# Patient Record
Sex: FEMALE | Race: WHITE | NOT HISPANIC OR LATINO | Employment: FULL TIME | URBAN - METROPOLITAN AREA
[De-identification: names, ages, dates, MRNs, and addresses within clinical notes are randomized per-mention and may not be internally consistent; named-entity substitution may affect disease eponyms.]

---

## 2017-07-06 ENCOUNTER — OFFICE VISIT - HEALTHEAST (OUTPATIENT)
Dept: VASCULAR SURGERY | Facility: CLINIC | Age: 52
End: 2017-07-06

## 2017-07-06 DIAGNOSIS — L73.8 PERFORATING FOLLICULITIS: ICD-10-CM

## 2017-07-06 DIAGNOSIS — M06.9 RHEUMATOID ARTHRITIS (H): ICD-10-CM

## 2017-07-06 DIAGNOSIS — E66.01 MORBID OBESITY WITH BMI OF 60.0-69.9, ADULT (H): ICD-10-CM

## 2017-07-06 DIAGNOSIS — I87.303 VENOUS HYPERTENSION OF LOWER EXTREMITY, BILATERAL: ICD-10-CM

## 2017-07-06 ASSESSMENT — MIFFLIN-ST. JEOR: SCORE: 2462.4

## 2017-12-21 ENCOUNTER — COMMUNICATION - HEALTHEAST (OUTPATIENT)
Dept: VASCULAR SURGERY | Facility: CLINIC | Age: 52
End: 2017-12-21

## 2019-05-30 ENCOUNTER — OFFICE VISIT - HEALTHEAST (OUTPATIENT)
Dept: VASCULAR SURGERY | Facility: CLINIC | Age: 54
End: 2019-05-30

## 2019-05-30 DIAGNOSIS — M79.89 SWELLING OF LIMB: ICD-10-CM

## 2019-05-30 DIAGNOSIS — I87.303 VENOUS HYPERTENSION OF LOWER EXTREMITY, BILATERAL: ICD-10-CM

## 2019-05-30 DIAGNOSIS — E66.01 MORBID OBESITY WITH BMI OF 60.0-69.9, ADULT (H): ICD-10-CM

## 2019-05-30 ASSESSMENT — MIFFLIN-ST. JEOR: SCORE: 2552.22

## 2019-05-31 ENCOUNTER — COMMUNICATION - HEALTHEAST (OUTPATIENT)
Dept: SURGERY | Facility: CLINIC | Age: 54
End: 2019-05-31

## 2020-11-05 ENCOUNTER — COMMUNICATION - HEALTHEAST (OUTPATIENT)
Dept: VASCULAR SURGERY | Facility: CLINIC | Age: 55
End: 2020-11-05

## 2021-05-28 ENCOUNTER — RECORDS - HEALTHEAST (OUTPATIENT)
Dept: ADMINISTRATIVE | Facility: CLINIC | Age: 56
End: 2021-05-28

## 2021-05-29 NOTE — PROGRESS NOTES
Date of Service: 05/30/19    Date last seen:  07/-6/17    PCP: Martha Tuttle MD    Impression:  1.  Bilateral leg swelling-under good control  2.  Bilateral venous hypertension in the legs  3.  Complicated by underlying RA and lupus  4.  Complicated by morbid obesity-unable to lose weight    Plan:  1.   Questions were answered.    2.   New compression written for.  3.   Continue exercise and weight loss.  National Registry of Weight Loss has info.  Agreeable now to go to bariatric medicine.  Referral written.  4.   Patient will follow up in 1 year or when needed.     Time spent with patient 25 minutes with greater than 50% time in consultation, education and coordination of care.     ---------------------------------------------------------------------------------------------------------------------    Chief Complaint: Bilateral leg swelling     History of Present Illness:    Bina Miller returns to the [facility] for follow up of bilateral leg swelling felt to be due to long-standing venous hypertension complicated by rheumatoid arthritis, lupus and morbid obesity with dependent swelling.  She lives with her mother and is her caretaker.  She continues to work full time at [employer].   She had been working on weight loss using a fitbit, eating more fruits and vegetables and discontinuing diet soda drinking regular and sparking water as alternatives.  She was to continue wearing her compression socks.  Follow-up today she reports she wears regular compression socks and needs new ones.  The swelling has remained the same.  It is under good control.  She has been unable to lose weight despite her efforts. There has been no new numbness, tingling, weakness, masses, rashes, shortness of breath or chest pain. There have not been any new areas of ulceration. There has been no new fevers or pain.  There are no new or changing skin lesions.     Past Medical History:   Diagnosis  Date     Fibromyalgia      Leg swelling      Lupus      Morbid obesity (H)      Osteoarthritis      Perforating folliculitis      RA (rheumatoid arthritis) (H)      Venous stasis        Past Surgical History:   Procedure Laterality Date     LAPAROSCOPIC SUPRACERVICAL HYSTERECTOMY       TONSILLECTOMY      as child      WISDOM TOOTH EXTRACTION         Current Outpatient Medications   Medication Sig Dispense Refill     allopurinol (ZYLOPRIM) 300 MG tablet Take 300 mg by mouth daily.       fluticasone (FLONASE) 50 mcg/actuation nasal spray        hydroxychloroquine (PLAQUENIL) 200 mg tablet Take 200 mg by mouth 2 (two) times a day.       losartan (COZAAR) 25 MG tablet Take 25 mg by mouth daily.       LOTEMAX 0.5 % DrpG as needed.       RESTASIS 0.05 % ophthalmic emulsion        verapamil (CALAN-SR) 180 MG CR tablet Take 180 mg by mouth daily.       No current facility-administered medications for this visit.        Allergies   Allergen Reactions     Ace Inhibitors Cough     Ibuprofen Hives     Keflex [Cephalexin] Other (See Comments) and Headache     Light sensitivity     Novocain [Procaine] Hives     Penicillins Other (See Comments)     Patient does not take as she has a strong family history anaphylaxis in the family.       Social History     Socioeconomic History     Marital status: Single     Spouse name: Not on file     Number of children: Not on file     Years of education: Not on file     Highest education level: Not on file   Occupational History     Not on file   Social Needs     Financial resource strain: Not on file     Food insecurity:     Worry: Not on file     Inability: Not on file     Transportation needs:     Medical: Not on file     Non-medical: Not on file   Tobacco Use     Smoking status: Never Smoker     Smokeless tobacco: Never Used   Substance and Sexual Activity     Alcohol use: No     Drug use: No     Sexual activity: Never   Lifestyle     Physical activity:     Days per week: Not on file      Minutes per session: Not on file     Stress: Not on file   Relationships     Social connections:     Talks on phone: Not on file     Gets together: Not on file     Attends Restorationist service: Not on file     Active member of club or organization: Not on file     Attends meetings of clubs or organizations: Not on file     Relationship status: Not on file     Intimate partner violence:     Fear of current or ex partner: Not on file     Emotionally abused: Not on file     Physically abused: Not on file     Forced sexual activity: Not on file   Other Topics Concern     Not on file   Social History Narrative     for [employer].  Single.  No kids.  Living with mother to help her.       Family History   Problem Relation Age of Onset     Cancer Father         pancreatic     Melanoma Mother      Asthma Mother      Atrial fibrillation Mother      Rheum arthritis Mother      Rheum arthritis Brother      No Medical Problems Brother        Review of Systems:    Bina Miller no new numbess, tingling or weakness, fevers, new masses, abdominal bloating or discomfort, unexplained weight loss, new ulcers, shortness of breath and chest pain  Full 12 point review of systems was completed.    Imaging:     I personally reviewed the following imaging today and those on care everywhere, if indicated    Nothing to review.  Venous insufficiency study never done.    Labs:    I personally reviewed the following labs today and those on care everywhere, if indicated    No results found for: SEDRATE      No results found for: CRP        No results found for: CREATININE   No results found for: HGBA1C        No results found for: BUN           No results found for: LABPROT, ALBUMIN    No results found for: BERYSFUM27HC    No results found for: TSH  No results found for: WBC, HGB, HCT, MCV, PLT    5/6/2019 common chemistry within normal limits.  BUN 16, creatinine 0.81, glucose 96, vitamin D 25 total 44.  Sed rate 19 hemoglobin 13.9,  white blood cell count 8.7.    Physical Exam:  Vitals:    05/30/19 1436   BP: 110/78   Pulse: 72   Resp: 18   Temp: 98.9  F (37.2  C)     BMI 64.32 weight 423 pounds    Circumferential measures:    Vasc Edema 12/16/2015 7/6/2017 5/30/2019   Right just above MTP 24.9 23.6 24.5   Right Ankle 29.2 27.0 29.3   Right Widest Calf 64.2 62.3 65.7   Right Thigh Up 10cm 89.1 82.0 90.3   Left - just above MTP 25.2 27.0 25.5   Left Ankle 28.9 28.4 32.3   Left Widest Calf 64.5 63.5 57.8   Left Thigh Up 10cm 89.6 86.0 93.8     Measures are stable.    General:  53 y.o. female in no apparent distress.      Psych: Alert and oriented x 3.  Cooperative. Affect normal.    HEENT: Atraumatic and normocephalic    Musculoskeletal:  Normal range of motion in knees and ankles bilaterally throughout .  There is no active joint synovitis, erythema, swelling or joint laxity.      Neurological:  Sensation is intact to pin prick and light touch in both legs.  Strength testing is normal in hip flexion, knee flexion, knee extension, ankle dorsiflexion and great toe extension bilaterally.       Vascular: Dorsalis pedis and posterior tibialis pulses are strong and equal bilaterally. There are some significant venous varicosities .     Integumentary: Skin of the legs is uniformly warm and dry.  She continues to have some small papular circular erythematous irritations with her folliculitis.  Nails are thickened.     Mary Carranza MD, ABWMS, FACCWS, [facility]  Medical Director Wound Care and Lymphedema  [facility]  [phone]

## 2021-05-29 NOTE — PROGRESS NOTES
1 year follow bilateral leg swelling. No pain,  no numbness, or tingling in legs. Has had flare ups recently with arthritis and lupus. Weight has been up and down 25 lbs in the last 6 months. She stated it has been hard to make fresh-cooked meals. She is currently taking care of her mother and working full time, but recently able to start working from home.  Would like a new prescription for compression stockings- goes to [facility].

## 2021-05-29 NOTE — PATIENT INSTRUCTIONS - HE
"Please call [facility] to make an appointment for your compression stockings    [facility]  [address]  [place], [place] [id number] [phone]      A referral was sent to [facility]. You will receive a phone call in 1-2 business days to schedule you appointment. If for some reason you do not hear from them or wish to schedule sooner please call [phone] to schedule.    Locations    [facility]  [address] [place] [address]    [facility]  [address] [place] [address]    [facility]    Your care provider has referred you to [facility] for evaluation of weight loss options. We understand that patients who successfully get rid of excess weight can significantly improve other serious medical conditions.  [facility] offers two choices for patients who require significant weight loss, a surgical program and a non-surgical program.  You don't need to decide which is best for you right now, our team will help you determine the treatment that fits your unique circumstances best.     If you are considering surgical weight loss, the first step is to attend one of our fee Weight Loss Surgery seminars, which are held at [facility], and [facility] on a rotating basis. During this seminar you will learn about what surgery can and cannot do for you, our program at [facility], and the insurance authorization process. If you are \"sitting on the fence\" about surgery, we encourage you to attend this seminar so you can make a more informed decision.  After attending the free seminar, your next step is to schedule a comprehensive evaluation with one of our bariatricians.   If you are not ready for a surgical option, we also offer medically assisted weight loss support.  Our team will help you navigate these options.      Our clinic will contact you to " get you started in the program.   You are also welcome to call us at [phone] to schedule or if you have any other questions.     At [facility], we believe that weight loss surgery is only one step in an entire lifestyle plan to treat obesity. Paired with a healthy lifestyle and a commitment to long-term follow up with your bariatric care team, the surgery can jump-start lifestyle changes that lead to improved day-to-day health, self-esteem and quality of life.

## 2021-05-31 VITALS — WEIGHT: 293 LBS | BODY MASS INDEX: 44.41 KG/M2 | HEIGHT: 68 IN

## 2021-06-03 VITALS — HEIGHT: 68 IN | BODY MASS INDEX: 44.41 KG/M2 | WEIGHT: 293 LBS

## 2021-06-11 NOTE — PROGRESS NOTES
Date of Service: 07/06/17    Date last seen:  12/16/2015    PCP: Martha Tuttle MD    Impression:  1.  Bilateral leg swelling-under good control  2.  Bilateral venous hypertension in the legs  3.  Complicated by underlying RA and lupus  4.  Complicated by morbid obesity-doing well, losing weight slowly    Plan:  1.   Questions were answered.    2.   New compression written for.  3.   Continue exercise and weight loss.  National Registry of Weight Loss info given.  Talked about proteins and fluids.  Talked about additional ways to exercise. Continue to use Fitbit and my fitness pal.  4.   Patient will follow up in 1 year or when needed.     Time spent with patient 25 minutes with greater than 50% time in consultation, education and coordination of care.     ---------------------------------------------------------------------------------------------------------------------    Chief Complaint: Bilateral leg swelling       History of Present Illness:    Bina Miller returns to the [facility] for follow up of Bilateral leg swelling felt to be due to long-standing venous hypertension complicated by rheumatoid arthritis, lupus and morbid obesity with dependent swelling.   The last time I saw her she was improving from a left leg phlebitis and was to go to bariatric medicine and get a venous insufficiency study.  She did not do either one.  She is now living with her mother and taking care of her.  She continues to work full time at [employer].  She wears regular compression socks.  Her most recent are from 7/16.  She knows she needs new ones.  The swelling has remained the same.  There has been no new numbness, tingling, weakness, masses, rashes, shortness of breath or chest pain. There have not been any new areas of ulceration. There has been no new fevers or pain.  There are no new or changing skin lesions.  She and her mother have been working on weight loss.   In has a fitbit and is finding she is  getting competitive with it.  She is eating more fruits and vegetables.  She is off the diet soda.  She drinks regular and sparking water.  Pain is rated a 0 on a 10 point scale.     Past Medical History:   Diagnosis Date     Fibromyalgia      Leg swelling      Lupus      Morbid obesity      Osteoarthritis      Perforating folliculitis      RA (rheumatoid arthritis)      Venous stasis        Past Surgical History:   Procedure Laterality Date     LAPAROSCOPIC SUPRACERVICAL HYSTERECTOMY       TONSILLECTOMY      as child      WISDOM TOOTH EXTRACTION         Current Outpatient Prescriptions   Medication Sig Dispense Refill     allopurinol (ZYLOPRIM) 300 MG tablet Take 300 mg by mouth daily.       fluticasone (FLONASE) 50 mcg/actuation nasal spray        hydroxychloroquine (PLAQUENIL) 200 mg tablet Take 200 mg by mouth 2 (two) times a day.       losartan (COZAAR) 25 MG tablet Take 25 mg by mouth daily.       LOTEMAX 0.5 % DrpG as needed.       RESTASIS 0.05 % ophthalmic emulsion        verapamil (CALAN-SR) 180 MG CR tablet Take 180 mg by mouth daily.       No current facility-administered medications for this visit.        Allergies   Allergen Reactions     Ace Inhibitors Cough     Ibuprofen Hives     Keflex [Cephalexin] Other (See Comments) and Headache     Light sensitivity     Novocain [Procaine] Hives     Penicillins Other (See Comments)     Patient does not take as she has a strong family history anaphylaxis in the family.       Social History     Social History     Marital status: Single     Spouse name: N/A     Number of children: N/A     Years of education: N/A     Occupational History     Not on file.     Social History Main Topics     Smoking status: Never Smoker     Smokeless tobacco: Never Used     Alcohol use No     Drug use: No     Sexual activity: No     Other Topics Concern     Not on file     Social History Narrative     for [employer].  Single.  No kids.  Living with mother to help her.        Family History   Problem Relation Age of Onset     Cancer Father      pancreatic     Melanoma Mother      Asthma Mother      Atrial fibrillation Mother      Rheum arthritis Mother      Rheum arthritis Brother      No Medical Problems Brother      Imaging:     Venous insufficiency study not done    Review of Systems:  Bina Miller no new numbess, tingling or weakness, fevers, new masses, abdominal bloating or discomfort, unexplained weight loss, new ulcers, shortness of breath and chest pain  Full 12 point review of systems was completed.    Physical Exam:  Vitals:    07/06/17 1325   BP: 120/60   Pulse: 80   Resp: 16   Temp: 98.3  F (36.8  C)    Body mass index is 61.31 kg/(m^2).    Circumferential measures:    Vasc Edema 12/16/2015 7/6/2017   Right just above MTP 24.9 23.6   Right Ankle 29.2 27.0   Right Widest Calf 64.2 62.3   Right Thigh Up 10cm 89.1 82.0   Left - just above MTP 25.2 27.0   Left Ankle 28.9 28.4   Left Widest Calf 64.5 63.5   Left Thigh Up 10cm 89.6 86.0     Measures are decreased.    General:  51 y.o. female in no apparent distress.  Alert and oriented x 3.  Cooperative. Affect normal.    Lungs: Clear to auscultation throughout with full inspiration.    CV: Normal S1 and S2, without murmurs, gallops, or rubs.  No audible carotid bruits. Regular rhythm.    Musculoskeletal:  Normal range of motion in knees and ankles bilaterally throughout .  There is no active joint synovitis, erythema, swelling or joint laxity.      Neurological:  Sensation is intact to pin prick and light touch in both legs.  Strength testing is normal in hip flexion, knee flexion, knee extension, ankle dorsiflexion and great toe extension bilaterally.       Vascular: Dorsalis pedis and posterior tibialis pulses are strong and equal bilaterally. There are some significant venous varicosities . There is normal capillary refill.     Integumentary: Skin of the legs is uniformly warm and dry.  She continues to have some small  papular circular erythematous irritations with her folliculitis.  Nails are thickened.     Mary Carranza MD, ABWMS, FACCWS, [facility]  Medical Director Wound Care and Lymphedema  [facility]  [phone]

## 2021-06-16 PROBLEM — M54.50 CHRONIC LOW BACK PAIN: Status: ACTIVE | Noted: 2019-05-30

## 2021-06-16 PROBLEM — M79.642 BILATERAL HAND PAIN: Status: ACTIVE | Noted: 2019-01-09

## 2021-06-16 PROBLEM — M13.0 POLYARTHRITIS: Status: ACTIVE | Noted: 2019-05-30

## 2021-06-16 PROBLEM — G43.909 MIGRAINE: Status: ACTIVE | Noted: 2019-05-30

## 2021-06-16 PROBLEM — K90.0 CELIAC DISEASE: Status: ACTIVE | Noted: 2019-05-30

## 2021-06-16 PROBLEM — G89.29 CHRONIC LOW BACK PAIN: Status: ACTIVE | Noted: 2019-05-30

## 2021-06-16 PROBLEM — M79.641 BILATERAL HAND PAIN: Status: ACTIVE | Noted: 2019-01-09

## 2021-06-19 NOTE — LETTER
Letter by Parker Garcia MD at      Author: Parker Garcia MD Service: -- Author Type: --    Filed:  Encounter Date: 5/31/2019 Status: (Other)         5/31/2019      Bina Miller  [address] [place] [address]      Dear Jaylon Currancome and thank you for your interest in the bariatric program at [facility]!     Your appointment is scheduled at our [place] Office on Wednesday August 21, 2019 at 2:00 PM with Dr. Parker Garica.  We ask that you arrive 45 minutes prior to your appointment time to complete your registration.   We strive to avoid clinic delays for our patients, so patients arriving late will need to reschedule.    Your first appointment will take about two hours.     In preparation for this appointment you will need to bring the following:      Your insurance card and photo identification    Completed health history form (enclosed).  Please make sure that you bring this form with you completed. There will not be time to complete this in the office so we will need to reschedule if you forget to do this.     All your medications in their original containers, including over-the-counter medications.    Any lab results that you have had done within the last 6 months.     If you are interested in our surgical program; call your insurance company prior to this visit, to verify that your specific insurance plan covers Weight-Loss Surgery and what your out-of-pocket costs may be.     Your appointment has been scheduled at our [place] Office- [address] [place] [address], [address], [place] [address].  [phone].    If you find yourself unable to keep this appointment, please call us to reschedule at your earliest convenience so we can accommodate other patients.    We are excited that you have chosen our program and look forward to serving you!    Sincerely,  The [facility] Team

## 2021-06-21 NOTE — LETTER
Letter by Mary Carranza MD at      Author: Mary Carranza MD Service: -- Author Type: --    Filed:  Encounter Date: 11/5/2020 Status: (Other)         11/05/20          Bina Miller  [address] [place] [address]    Dear Bina,    As a valued [facility] patient, your healthcare needs are our priority. Our clinic records indicate we have attempted to contact you to schedule with Dr. Carranza, but we have not heard back from you. If you wish to schedule your appointment please contact our office to schedule your appointment at your soonest convenience. If you have already made an appointment, please disregard this letter.   We can be reached at: [phone]    Sincerely,    [facility]

## 2024-09-05 ENCOUNTER — HOSPITAL ENCOUNTER (OUTPATIENT)
Facility: HOSPITAL | Age: 59
Setting detail: OBSERVATION
Discharge: HOME | End: 2024-09-09
Attending: EMERGENCY MEDICINE | Admitting: EMERGENCY MEDICINE
Payer: COMMERCIAL

## 2024-09-05 ENCOUNTER — APPOINTMENT (OUTPATIENT)
Dept: CT IMAGING | Facility: HOSPITAL | Age: 59
End: 2024-09-05
Attending: EMERGENCY MEDICINE
Payer: COMMERCIAL

## 2024-09-05 DIAGNOSIS — R11.0 NAUSEA: ICD-10-CM

## 2024-09-05 DIAGNOSIS — R10.32 LLQ ABDOMINAL PAIN: ICD-10-CM

## 2024-09-05 DIAGNOSIS — N13.9 OBSTRUCTIVE UROPATHY: Primary | ICD-10-CM

## 2024-09-05 DIAGNOSIS — N20.1 URETEROLITHIASIS: ICD-10-CM

## 2024-09-05 LAB
ALBUMIN SERPL BCG-MCNC: 4.4 G/DL (ref 3.5–5.2)
ALBUMIN UR-MCNC: NEGATIVE MG/DL
ALP SERPL-CCNC: 77 U/L (ref 40–150)
ALT SERPL W P-5'-P-CCNC: 18 U/L (ref 0–50)
ANION GAP SERPL CALCULATED.3IONS-SCNC: 14 MMOL/L (ref 7–15)
APPEARANCE UR: CLEAR
AST SERPL W P-5'-P-CCNC: 22 U/L (ref 0–45)
BACTERIA #/AREA URNS HPF: ABNORMAL /HPF
BILIRUB SERPL-MCNC: 1 MG/DL
BILIRUB UR QL STRIP: NEGATIVE
BUN SERPL-MCNC: 20.1 MG/DL (ref 8–23)
CALCIUM SERPL-MCNC: 9.6 MG/DL (ref 8.8–10.4)
CHLORIDE SERPL-SCNC: 99 MMOL/L (ref 98–107)
COLOR UR AUTO: ABNORMAL
CREAT SERPL-MCNC: 1.27 MG/DL (ref 0.51–0.95)
EGFRCR SERPLBLD CKD-EPI 2021: 48 ML/MIN/1.73M2
ERYTHROCYTE [DISTWIDTH] IN BLOOD BY AUTOMATED COUNT: 17.5 % (ref 10–15)
GLUCOSE SERPL-MCNC: 121 MG/DL (ref 70–99)
GLUCOSE UR STRIP-MCNC: NEGATIVE MG/DL
HCO3 SERPL-SCNC: 23 MMOL/L (ref 22–29)
HCT VFR BLD AUTO: 42.4 % (ref 35–47)
HGB BLD-MCNC: 13.9 G/DL (ref 11.7–15.7)
HGB UR QL STRIP: ABNORMAL
HOLD SPECIMEN: NORMAL
KETONES UR STRIP-MCNC: 20 MG/DL
LACTATE SERPL-SCNC: 1.5 MMOL/L (ref 0.7–2)
LEUKOCYTE ESTERASE UR QL STRIP: NEGATIVE
LIPASE SERPL-CCNC: 24 U/L (ref 13–60)
MCH RBC QN AUTO: 28.3 PG (ref 26.5–33)
MCHC RBC AUTO-ENTMCNC: 32.8 G/DL (ref 31.5–36.5)
MCV RBC AUTO: 86 FL (ref 78–100)
MUCOUS THREADS #/AREA URNS LPF: PRESENT /LPF
NITRATE UR QL: NEGATIVE
PH UR STRIP: 7.5 [PH] (ref 5–7)
PLATELET # BLD AUTO: 212 10E3/UL (ref 150–450)
POTASSIUM SERPL-SCNC: 4.6 MMOL/L (ref 3.4–5.3)
PROT SERPL-MCNC: 7.8 G/DL (ref 6.4–8.3)
RBC # BLD AUTO: 4.91 10E6/UL (ref 3.8–5.2)
RBC URINE: 3 /HPF
SODIUM SERPL-SCNC: 136 MMOL/L (ref 135–145)
SP GR UR STRIP: 1.02 (ref 1–1.03)
SQUAMOUS EPITHELIAL: 1 /HPF
UROBILINOGEN UR STRIP-MCNC: <2 MG/DL
WBC # BLD AUTO: 11.9 10E3/UL (ref 4–11)
WBC URINE: <1 /HPF

## 2024-09-05 PROCEDURE — 83605 ASSAY OF LACTIC ACID: CPT | Performed by: EMERGENCY MEDICINE

## 2024-09-05 PROCEDURE — 87086 URINE CULTURE/COLONY COUNT: CPT | Performed by: INTERNAL MEDICINE

## 2024-09-05 PROCEDURE — 36415 COLL VENOUS BLD VENIPUNCTURE: CPT | Performed by: EMERGENCY MEDICINE

## 2024-09-05 PROCEDURE — 86140 C-REACTIVE PROTEIN: CPT | Performed by: INTERNAL MEDICINE

## 2024-09-05 PROCEDURE — 250N000011 HC RX IP 250 OP 636: Performed by: EMERGENCY MEDICINE

## 2024-09-05 PROCEDURE — 258N000003 HC RX IP 258 OP 636: Performed by: EMERGENCY MEDICINE

## 2024-09-05 PROCEDURE — 96375 TX/PRO/DX INJ NEW DRUG ADDON: CPT

## 2024-09-05 PROCEDURE — 85027 COMPLETE CBC AUTOMATED: CPT | Performed by: EMERGENCY MEDICINE

## 2024-09-05 PROCEDURE — 80053 COMPREHEN METABOLIC PANEL: CPT | Performed by: EMERGENCY MEDICINE

## 2024-09-05 PROCEDURE — 74177 CT ABD & PELVIS W/CONTRAST: CPT

## 2024-09-05 PROCEDURE — 84145 PROCALCITONIN (PCT): CPT | Performed by: INTERNAL MEDICINE

## 2024-09-05 PROCEDURE — 96361 HYDRATE IV INFUSION ADD-ON: CPT

## 2024-09-05 PROCEDURE — 81001 URINALYSIS AUTO W/SCOPE: CPT | Performed by: EMERGENCY MEDICINE

## 2024-09-05 PROCEDURE — 99285 EMERGENCY DEPT VISIT HI MDM: CPT | Mod: 25

## 2024-09-05 PROCEDURE — 83690 ASSAY OF LIPASE: CPT | Performed by: EMERGENCY MEDICINE

## 2024-09-05 RX ORDER — IOPAMIDOL 755 MG/ML
75 INJECTION, SOLUTION INTRAVASCULAR ONCE
Status: COMPLETED | OUTPATIENT
Start: 2024-09-05 | End: 2024-09-05

## 2024-09-05 RX ORDER — HYDROMORPHONE HCL IN WATER/PF 6 MG/30 ML
0.5 PATIENT CONTROLLED ANALGESIA SYRINGE INTRAVENOUS ONCE
Status: COMPLETED | OUTPATIENT
Start: 2024-09-06 | End: 2024-09-06

## 2024-09-05 RX ORDER — ONDANSETRON 2 MG/ML
4 INJECTION INTRAMUSCULAR; INTRAVENOUS ONCE
Status: COMPLETED | OUTPATIENT
Start: 2024-09-05 | End: 2024-09-05

## 2024-09-05 RX ADMIN — IOPAMIDOL 75 ML: 755 INJECTION, SOLUTION INTRAVENOUS at 23:21

## 2024-09-05 RX ADMIN — SODIUM CHLORIDE 1000 ML: 9 INJECTION, SOLUTION INTRAVENOUS at 22:25

## 2024-09-05 RX ADMIN — HYDROMORPHONE HYDROCHLORIDE 1 MG: 1 INJECTION, SOLUTION INTRAMUSCULAR; INTRAVENOUS; SUBCUTANEOUS at 22:25

## 2024-09-05 RX ADMIN — ONDANSETRON 4 MG: 2 INJECTION INTRAMUSCULAR; INTRAVENOUS at 22:25

## 2024-09-05 ASSESSMENT — ACTIVITIES OF DAILY LIVING (ADL)
ADLS_ACUITY_SCORE: 33
ADLS_ACUITY_SCORE: 35

## 2024-09-06 ENCOUNTER — APPOINTMENT (OUTPATIENT)
Dept: RADIOLOGY | Facility: HOSPITAL | Age: 59
End: 2024-09-06
Attending: UROLOGY
Payer: COMMERCIAL

## 2024-09-06 ENCOUNTER — ANESTHESIA (OUTPATIENT)
Dept: SURGERY | Facility: HOSPITAL | Age: 59
End: 2024-09-06
Payer: COMMERCIAL

## 2024-09-06 ENCOUNTER — ANESTHESIA EVENT (OUTPATIENT)
Dept: SURGERY | Facility: HOSPITAL | Age: 59
End: 2024-09-06
Payer: COMMERCIAL

## 2024-09-06 PROBLEM — N20.1 URETEROLITHIASIS: Status: ACTIVE | Noted: 2024-09-06

## 2024-09-06 PROBLEM — R35.0 URINARY FREQUENCY: Status: ACTIVE | Noted: 2024-09-06

## 2024-09-06 PROBLEM — R11.0 NAUSEA: Status: ACTIVE | Noted: 2024-09-06

## 2024-09-06 PROBLEM — R10.32 LLQ ABDOMINAL PAIN: Status: ACTIVE | Noted: 2024-09-06

## 2024-09-06 PROBLEM — N13.9 OBSTRUCTIVE UROPATHY: Status: ACTIVE | Noted: 2024-09-06

## 2024-09-06 LAB
ALBUMIN SERPL BCG-MCNC: 4.1 G/DL (ref 3.5–5.2)
ALP SERPL-CCNC: 69 U/L (ref 40–150)
ALT SERPL W P-5'-P-CCNC: 17 U/L (ref 0–50)
ANION GAP SERPL CALCULATED.3IONS-SCNC: 12 MMOL/L (ref 7–15)
AST SERPL W P-5'-P-CCNC: 17 U/L (ref 0–45)
BASOPHILS # BLD AUTO: 0 10E3/UL (ref 0–0.2)
BASOPHILS NFR BLD AUTO: 0 %
BILIRUB SERPL-MCNC: 0.9 MG/DL
BUN SERPL-MCNC: 19.4 MG/DL (ref 8–23)
CALCIUM SERPL-MCNC: 9.2 MG/DL (ref 8.8–10.4)
CHLORIDE SERPL-SCNC: 100 MMOL/L (ref 98–107)
CREAT SERPL-MCNC: 1.41 MG/DL (ref 0.51–0.95)
CRP SERPL-MCNC: 21.4 MG/L
EGFRCR SERPLBLD CKD-EPI 2021: 42 ML/MIN/1.73M2
EOSINOPHIL # BLD AUTO: 0 10E3/UL (ref 0–0.7)
EOSINOPHIL NFR BLD AUTO: 0 %
ERYTHROCYTE [DISTWIDTH] IN BLOOD BY AUTOMATED COUNT: 17.8 % (ref 10–15)
GLUCOSE BLDC GLUCOMTR-MCNC: 119 MG/DL (ref 70–99)
GLUCOSE BLDC GLUCOMTR-MCNC: 122 MG/DL (ref 70–99)
GLUCOSE BLDC GLUCOMTR-MCNC: 130 MG/DL (ref 70–99)
GLUCOSE SERPL-MCNC: 117 MG/DL (ref 70–99)
HCO3 SERPL-SCNC: 25 MMOL/L (ref 22–29)
HCT VFR BLD AUTO: 40.3 % (ref 35–47)
HGB BLD-MCNC: 12.9 G/DL (ref 11.7–15.7)
IMM GRANULOCYTES # BLD: 0.1 10E3/UL
IMM GRANULOCYTES NFR BLD: 1 %
INR PPP: 2.73 (ref 0.85–1.15)
LYMPHOCYTES # BLD AUTO: 2.2 10E3/UL (ref 0.8–5.3)
LYMPHOCYTES NFR BLD AUTO: 21 %
MAGNESIUM SERPL-MCNC: 2.1 MG/DL (ref 1.7–2.3)
MCH RBC QN AUTO: 28.7 PG (ref 26.5–33)
MCHC RBC AUTO-ENTMCNC: 32 G/DL (ref 31.5–36.5)
MCV RBC AUTO: 90 FL (ref 78–100)
MONOCYTES # BLD AUTO: 1.2 10E3/UL (ref 0–1.3)
MONOCYTES NFR BLD AUTO: 11 %
NEUTROPHILS # BLD AUTO: 7.2 10E3/UL (ref 1.6–8.3)
NEUTROPHILS NFR BLD AUTO: 67 %
NRBC # BLD AUTO: 0 10E3/UL
NRBC BLD AUTO-RTO: 0 /100
PLATELET # BLD AUTO: 223 10E3/UL (ref 150–450)
POTASSIUM SERPL-SCNC: 4.6 MMOL/L (ref 3.4–5.3)
PROCALCITONIN SERPL IA-MCNC: 0.1 NG/ML
PROT SERPL-MCNC: 7.2 G/DL (ref 6.4–8.3)
RBC # BLD AUTO: 4.5 10E6/UL (ref 3.8–5.2)
SODIUM SERPL-SCNC: 137 MMOL/L (ref 135–145)
WBC # BLD AUTO: 10.7 10E3/UL (ref 4–11)

## 2024-09-06 PROCEDURE — 999N000180 XR SURGERY CARM FLUORO LESS THAN 5 MIN: Mod: TC

## 2024-09-06 PROCEDURE — 360N000082 HC SURGERY LEVEL 2 W/ FLUORO, PER MIN: Performed by: UROLOGY

## 2024-09-06 PROCEDURE — 96361 HYDRATE IV INFUSION ADD-ON: CPT

## 2024-09-06 PROCEDURE — 99207 PR APP CREDIT; MD BILLING SHARED VISIT: CPT | Performed by: STUDENT IN AN ORGANIZED HEALTH CARE EDUCATION/TRAINING PROGRAM

## 2024-09-06 PROCEDURE — 370N000017 HC ANESTHESIA TECHNICAL FEE, PER MIN: Performed by: UROLOGY

## 2024-09-06 PROCEDURE — C1769 GUIDE WIRE: HCPCS | Performed by: UROLOGY

## 2024-09-06 PROCEDURE — 82962 GLUCOSE BLOOD TEST: CPT

## 2024-09-06 PROCEDURE — 250N000013 HC RX MED GY IP 250 OP 250 PS 637

## 2024-09-06 PROCEDURE — 83735 ASSAY OF MAGNESIUM: CPT | Performed by: INTERNAL MEDICINE

## 2024-09-06 PROCEDURE — G0378 HOSPITAL OBSERVATION PER HR: HCPCS

## 2024-09-06 PROCEDURE — 250N000013 HC RX MED GY IP 250 OP 250 PS 637: Performed by: STUDENT IN AN ORGANIZED HEALTH CARE EDUCATION/TRAINING PROGRAM

## 2024-09-06 PROCEDURE — 96365 THER/PROPH/DIAG IV INF INIT: CPT | Mod: 59

## 2024-09-06 PROCEDURE — 36415 COLL VENOUS BLD VENIPUNCTURE: CPT | Performed by: INTERNAL MEDICINE

## 2024-09-06 PROCEDURE — 250N000013 HC RX MED GY IP 250 OP 250 PS 637: Performed by: INTERNAL MEDICINE

## 2024-09-06 PROCEDURE — 52332 CYSTOSCOPY AND TREATMENT: CPT | Performed by: ANESTHESIOLOGY

## 2024-09-06 PROCEDURE — 250N000011 HC RX IP 250 OP 636

## 2024-09-06 PROCEDURE — 272N000001 HC OR GENERAL SUPPLY STERILE: Performed by: UROLOGY

## 2024-09-06 PROCEDURE — 250N000011 HC RX IP 250 OP 636: Performed by: NURSE ANESTHETIST, CERTIFIED REGISTERED

## 2024-09-06 PROCEDURE — 85610 PROTHROMBIN TIME: CPT | Performed by: INTERNAL MEDICINE

## 2024-09-06 PROCEDURE — 999N000141 HC STATISTIC PRE-PROCEDURE NURSING ASSESSMENT: Performed by: UROLOGY

## 2024-09-06 PROCEDURE — 96376 TX/PRO/DX INJ SAME DRUG ADON: CPT

## 2024-09-06 PROCEDURE — 255N000002 HC RX 255 OP 636: Performed by: UROLOGY

## 2024-09-06 PROCEDURE — 258N000003 HC RX IP 258 OP 636: Performed by: ANESTHESIOLOGY

## 2024-09-06 PROCEDURE — 258N000001 HC RX 258: Performed by: UROLOGY

## 2024-09-06 PROCEDURE — 96361 HYDRATE IV INFUSION ADD-ON: CPT | Mod: 59

## 2024-09-06 PROCEDURE — C2617 STENT, NON-COR, TEM W/O DEL: HCPCS | Performed by: UROLOGY

## 2024-09-06 PROCEDURE — 250N000009 HC RX 250: Performed by: NURSE ANESTHETIST, CERTIFIED REGISTERED

## 2024-09-06 PROCEDURE — 250N000011 HC RX IP 250 OP 636: Performed by: INTERNAL MEDICINE

## 2024-09-06 PROCEDURE — 258N000003 HC RX IP 258 OP 636: Performed by: NURSE ANESTHETIST, CERTIFIED REGISTERED

## 2024-09-06 PROCEDURE — 250N000011 HC RX IP 250 OP 636: Performed by: EMERGENCY MEDICINE

## 2024-09-06 PROCEDURE — 82374 ASSAY BLOOD CARBON DIOXIDE: CPT | Performed by: INTERNAL MEDICINE

## 2024-09-06 PROCEDURE — 258N000003 HC RX IP 258 OP 636: Performed by: INTERNAL MEDICINE

## 2024-09-06 PROCEDURE — 99223 1ST HOSP IP/OBS HIGH 75: CPT | Performed by: INTERNAL MEDICINE

## 2024-09-06 PROCEDURE — 85025 COMPLETE CBC W/AUTO DIFF WBC: CPT | Performed by: INTERNAL MEDICINE

## 2024-09-06 PROCEDURE — 52332 CYSTOSCOPY AND TREATMENT: CPT | Performed by: NURSE ANESTHETIST, CERTIFIED REGISTERED

## 2024-09-06 PROCEDURE — 96375 TX/PRO/DX INJ NEW DRUG ADDON: CPT | Mod: 59

## 2024-09-06 PROCEDURE — 82040 ASSAY OF SERUM ALBUMIN: CPT | Performed by: INTERNAL MEDICINE

## 2024-09-06 DEVICE — URETERAL STENT
Type: IMPLANTABLE DEVICE | Site: URETER | Status: NON-FUNCTIONAL
Brand: PERCUFLEX™ PLUS
Removed: 2024-09-19

## 2024-09-06 RX ORDER — LEVOFLOXACIN 5 MG/ML
500 INJECTION, SOLUTION INTRAVENOUS EVERY 24 HOURS
Status: DISCONTINUED | OUTPATIENT
Start: 2024-09-06 | End: 2024-09-06

## 2024-09-06 RX ORDER — PROPOFOL 10 MG/ML
INJECTION, EMULSION INTRAVENOUS CONTINUOUS PRN
Status: DISCONTINUED | OUTPATIENT
Start: 2024-09-06 | End: 2024-09-06

## 2024-09-06 RX ORDER — ACETAMINOPHEN 325 MG/1
975 TABLET ORAL ONCE
Status: COMPLETED | OUTPATIENT
Start: 2024-09-06 | End: 2024-09-06

## 2024-09-06 RX ORDER — ALLOPURINOL 300 MG/1
300 TABLET ORAL DAILY
Status: DISCONTINUED | OUTPATIENT
Start: 2024-09-06 | End: 2024-09-09 | Stop reason: HOSPADM

## 2024-09-06 RX ORDER — NALOXONE HYDROCHLORIDE 0.4 MG/ML
0.2 INJECTION, SOLUTION INTRAMUSCULAR; INTRAVENOUS; SUBCUTANEOUS
Status: DISCONTINUED | OUTPATIENT
Start: 2024-09-06 | End: 2024-09-09 | Stop reason: HOSPADM

## 2024-09-06 RX ORDER — NALOXONE HYDROCHLORIDE 0.4 MG/ML
0.4 INJECTION, SOLUTION INTRAMUSCULAR; INTRAVENOUS; SUBCUTANEOUS
Status: DISCONTINUED | OUTPATIENT
Start: 2024-09-06 | End: 2024-09-09 | Stop reason: HOSPADM

## 2024-09-06 RX ORDER — DEXAMETHASONE SODIUM PHOSPHATE 10 MG/ML
INJECTION, SOLUTION INTRAMUSCULAR; INTRAVENOUS PRN
Status: DISCONTINUED | OUTPATIENT
Start: 2024-09-06 | End: 2024-09-06

## 2024-09-06 RX ORDER — ONDANSETRON 4 MG/1
4 TABLET, ORALLY DISINTEGRATING ORAL EVERY 6 HOURS PRN
Status: DISCONTINUED | OUTPATIENT
Start: 2024-09-06 | End: 2024-09-09 | Stop reason: HOSPADM

## 2024-09-06 RX ORDER — FEXOFENADINE HCL 60 MG/1
180 TABLET, FILM COATED ORAL DAILY
Status: DISCONTINUED | OUTPATIENT
Start: 2024-09-06 | End: 2024-09-09 | Stop reason: HOSPADM

## 2024-09-06 RX ORDER — WARFARIN SODIUM 5 MG/1
10 TABLET ORAL
Status: ON HOLD | COMMUNITY
End: 2024-09-09

## 2024-09-06 RX ORDER — MEROPENEM 1 G/1
1 INJECTION, POWDER, FOR SOLUTION INTRAVENOUS EVERY 12 HOURS
Status: DISCONTINUED | OUTPATIENT
Start: 2024-09-06 | End: 2024-09-06 | Stop reason: ALTCHOICE

## 2024-09-06 RX ORDER — DULOXETIN HYDROCHLORIDE 20 MG/1
2 CAPSULE, DELAYED RELEASE ORAL DAILY
COMMUNITY

## 2024-09-06 RX ORDER — HYDRALAZINE HYDROCHLORIDE 20 MG/ML
5 INJECTION INTRAMUSCULAR; INTRAVENOUS EVERY 4 HOURS PRN
Status: DISCONTINUED | OUTPATIENT
Start: 2024-09-06 | End: 2024-09-09 | Stop reason: HOSPADM

## 2024-09-06 RX ORDER — VERAPAMIL HYDROCHLORIDE 180 MG/1
180 TABLET, EXTENDED RELEASE ORAL AT BEDTIME
Status: DISCONTINUED | OUTPATIENT
Start: 2024-09-06 | End: 2024-09-09 | Stop reason: HOSPADM

## 2024-09-06 RX ORDER — WARFARIN SODIUM 5 MG/1
5 TABLET ORAL
Status: DISCONTINUED | OUTPATIENT
Start: 2024-09-06 | End: 2024-09-06

## 2024-09-06 RX ORDER — ACETAMINOPHEN 325 MG/1
650 TABLET ORAL EVERY 4 HOURS PRN
Status: DISCONTINUED | OUTPATIENT
Start: 2024-09-06 | End: 2024-09-09 | Stop reason: HOSPADM

## 2024-09-06 RX ORDER — CEFAZOLIN SODIUM/WATER 3 G/30 ML
3 SYRINGE (ML) INTRAVENOUS
Status: COMPLETED | OUTPATIENT
Start: 2024-09-06 | End: 2024-09-06

## 2024-09-06 RX ORDER — HYDROMORPHONE HCL IN WATER/PF 6 MG/30 ML
0.4 PATIENT CONTROLLED ANALGESIA SYRINGE INTRAVENOUS
Status: DISCONTINUED | OUTPATIENT
Start: 2024-09-06 | End: 2024-09-06

## 2024-09-06 RX ORDER — ACETAMINOPHEN 650 MG/1
650 SUPPOSITORY RECTAL ONCE
Status: COMPLETED | OUTPATIENT
Start: 2024-09-06 | End: 2024-09-06

## 2024-09-06 RX ORDER — CALCIUM CARBONATE 500 MG/1
1000 TABLET, CHEWABLE ORAL 4 TIMES DAILY PRN
Status: DISCONTINUED | OUTPATIENT
Start: 2024-09-06 | End: 2024-09-09 | Stop reason: HOSPADM

## 2024-09-06 RX ORDER — PROPOFOL 10 MG/ML
INJECTION, EMULSION INTRAVENOUS PRN
Status: DISCONTINUED | OUTPATIENT
Start: 2024-09-06 | End: 2024-09-06

## 2024-09-06 RX ORDER — HYDROXYCHLOROQUINE SULFATE 200 MG/1
200 TABLET, FILM COATED ORAL 2 TIMES DAILY
Status: DISCONTINUED | OUTPATIENT
Start: 2024-09-06 | End: 2024-09-09 | Stop reason: HOSPADM

## 2024-09-06 RX ORDER — ONDANSETRON 2 MG/ML
4 INJECTION INTRAMUSCULAR; INTRAVENOUS EVERY 6 HOURS PRN
Status: DISCONTINUED | OUTPATIENT
Start: 2024-09-06 | End: 2024-09-09 | Stop reason: HOSPADM

## 2024-09-06 RX ORDER — FENTANYL CITRATE 50 UG/ML
INJECTION, SOLUTION INTRAMUSCULAR; INTRAVENOUS PRN
Status: DISCONTINUED | OUTPATIENT
Start: 2024-09-06 | End: 2024-09-06

## 2024-09-06 RX ORDER — CYCLOSPORINE 0.5 MG/ML
1 EMULSION OPHTHALMIC 2 TIMES DAILY
Status: DISCONTINUED | OUTPATIENT
Start: 2024-09-06 | End: 2024-09-09 | Stop reason: HOSPADM

## 2024-09-06 RX ORDER — CEFAZOLIN SODIUM/WATER 3 G/30 ML
3 SYRINGE (ML) INTRAVENOUS SEE ADMIN INSTRUCTIONS
Status: DISCONTINUED | OUTPATIENT
Start: 2024-09-06 | End: 2024-09-07

## 2024-09-06 RX ORDER — LIDOCAINE 40 MG/G
CREAM TOPICAL
Status: DISCONTINUED | OUTPATIENT
Start: 2024-09-06 | End: 2024-09-09 | Stop reason: HOSPADM

## 2024-09-06 RX ORDER — AMOXICILLIN 250 MG
2 CAPSULE ORAL 2 TIMES DAILY PRN
Status: DISCONTINUED | OUTPATIENT
Start: 2024-09-06 | End: 2024-09-09 | Stop reason: HOSPADM

## 2024-09-06 RX ORDER — LIDOCAINE 40 MG/G
CREAM TOPICAL
Status: DISCONTINUED | OUTPATIENT
Start: 2024-09-06 | End: 2024-09-07

## 2024-09-06 RX ORDER — SODIUM CHLORIDE, SODIUM LACTATE, POTASSIUM CHLORIDE, CALCIUM CHLORIDE 600; 310; 30; 20 MG/100ML; MG/100ML; MG/100ML; MG/100ML
INJECTION, SOLUTION INTRAVENOUS CONTINUOUS PRN
Status: DISCONTINUED | OUTPATIENT
Start: 2024-09-06 | End: 2024-09-06

## 2024-09-06 RX ORDER — NICOTINE POLACRILEX 4 MG
15-30 LOZENGE BUCCAL
Status: DISCONTINUED | OUTPATIENT
Start: 2024-09-06 | End: 2024-09-09 | Stop reason: HOSPADM

## 2024-09-06 RX ORDER — HYDROMORPHONE HCL IN WATER/PF 6 MG/30 ML
0.2 PATIENT CONTROLLED ANALGESIA SYRINGE INTRAVENOUS
Status: DISCONTINUED | OUTPATIENT
Start: 2024-09-06 | End: 2024-09-06

## 2024-09-06 RX ORDER — SODIUM CHLORIDE, SODIUM LACTATE, POTASSIUM CHLORIDE, CALCIUM CHLORIDE 600; 310; 30; 20 MG/100ML; MG/100ML; MG/100ML; MG/100ML
INJECTION, SOLUTION INTRAVENOUS CONTINUOUS
Status: DISCONTINUED | OUTPATIENT
Start: 2024-09-06 | End: 2024-09-08

## 2024-09-06 RX ORDER — FEXOFENADINE HCL 180 MG/1
180 TABLET ORAL DAILY
COMMUNITY

## 2024-09-06 RX ORDER — DEXTROSE MONOHYDRATE 25 G/50ML
25-50 INJECTION, SOLUTION INTRAVENOUS
Status: DISCONTINUED | OUTPATIENT
Start: 2024-09-06 | End: 2024-09-09 | Stop reason: HOSPADM

## 2024-09-06 RX ORDER — CEFTRIAXONE 2 G/1
2 INJECTION, POWDER, FOR SOLUTION INTRAMUSCULAR; INTRAVENOUS EVERY 24 HOURS
Status: DISCONTINUED | OUTPATIENT
Start: 2024-09-06 | End: 2024-09-09 | Stop reason: HOSPADM

## 2024-09-06 RX ORDER — SODIUM CHLORIDE, SODIUM LACTATE, POTASSIUM CHLORIDE, CALCIUM CHLORIDE 600; 310; 30; 20 MG/100ML; MG/100ML; MG/100ML; MG/100ML
INJECTION, SOLUTION INTRAVENOUS CONTINUOUS
Status: DISCONTINUED | OUTPATIENT
Start: 2024-09-06 | End: 2024-09-07

## 2024-09-06 RX ORDER — HYDROMORPHONE HCL IN WATER/PF 6 MG/30 ML
0.3 PATIENT CONTROLLED ANALGESIA SYRINGE INTRAVENOUS
Status: DISCONTINUED | OUTPATIENT
Start: 2024-09-06 | End: 2024-09-06

## 2024-09-06 RX ORDER — ACETAMINOPHEN 650 MG/1
650 SUPPOSITORY RECTAL EVERY 4 HOURS PRN
Status: DISCONTINUED | OUTPATIENT
Start: 2024-09-06 | End: 2024-09-09 | Stop reason: HOSPADM

## 2024-09-06 RX ORDER — AMOXICILLIN 250 MG
1 CAPSULE ORAL 2 TIMES DAILY PRN
Status: DISCONTINUED | OUTPATIENT
Start: 2024-09-06 | End: 2024-09-09 | Stop reason: HOSPADM

## 2024-09-06 RX ORDER — WARFARIN SODIUM 5 MG/1
7.5 TABLET ORAL
Status: ON HOLD | COMMUNITY
End: 2024-09-09

## 2024-09-06 RX ORDER — ONDANSETRON 2 MG/ML
INJECTION INTRAMUSCULAR; INTRAVENOUS PRN
Status: DISCONTINUED | OUTPATIENT
Start: 2024-09-06 | End: 2024-09-06

## 2024-09-06 RX ORDER — TAMSULOSIN HYDROCHLORIDE 0.4 MG/1
0.4 CAPSULE ORAL DAILY
Status: DISCONTINUED | OUTPATIENT
Start: 2024-09-06 | End: 2024-09-09 | Stop reason: HOSPADM

## 2024-09-06 RX ORDER — HYDROMORPHONE HCL IN WATER/PF 6 MG/30 ML
0.4 PATIENT CONTROLLED ANALGESIA SYRINGE INTRAVENOUS EVERY 4 HOURS PRN
Status: DISCONTINUED | OUTPATIENT
Start: 2024-09-06 | End: 2024-09-07

## 2024-09-06 RX ORDER — DULOXETIN HYDROCHLORIDE 20 MG/1
40 CAPSULE, DELAYED RELEASE ORAL DAILY
Status: DISCONTINUED | OUTPATIENT
Start: 2024-09-06 | End: 2024-09-09 | Stop reason: HOSPADM

## 2024-09-06 RX ADMIN — FENTANYL CITRATE 25 MCG: 50 INJECTION INTRAMUSCULAR; INTRAVENOUS at 15:43

## 2024-09-06 RX ADMIN — SODIUM CHLORIDE, POTASSIUM CHLORIDE, SODIUM LACTATE AND CALCIUM CHLORIDE: 600; 310; 30; 20 INJECTION, SOLUTION INTRAVENOUS at 15:33

## 2024-09-06 RX ADMIN — DEXAMETHASONE SODIUM PHOSPHATE 10 MG: 10 INJECTION, SOLUTION INTRAMUSCULAR; INTRAVENOUS at 15:46

## 2024-09-06 RX ADMIN — SODIUM CHLORIDE, POTASSIUM CHLORIDE, SODIUM LACTATE AND CALCIUM CHLORIDE: 600; 310; 30; 20 INJECTION, SOLUTION INTRAVENOUS at 00:41

## 2024-09-06 RX ADMIN — FENTANYL CITRATE 25 MCG: 50 INJECTION INTRAMUSCULAR; INTRAVENOUS at 15:47

## 2024-09-06 RX ADMIN — VERAPAMIL HYDROCHLORIDE 180 MG: 180 TABLET, FILM COATED, EXTENDED RELEASE ORAL at 14:32

## 2024-09-06 RX ADMIN — SODIUM CHLORIDE, POTASSIUM CHLORIDE, SODIUM LACTATE AND CALCIUM CHLORIDE: 600; 310; 30; 20 INJECTION, SOLUTION INTRAVENOUS at 15:29

## 2024-09-06 RX ADMIN — PROPOFOL 30 MG: 10 INJECTION, EMULSION INTRAVENOUS at 15:40

## 2024-09-06 RX ADMIN — HYDROXYCHLOROQUINE SULFATE 200 MG: 200 TABLET, FILM COATED ORAL at 20:42

## 2024-09-06 RX ADMIN — SODIUM CHLORIDE, POTASSIUM CHLORIDE, SODIUM LACTATE AND CALCIUM CHLORIDE: 600; 310; 30; 20 INJECTION, SOLUTION INTRAVENOUS at 22:40

## 2024-09-06 RX ADMIN — FEXOFENADINE HYDROCHLORIDE 180 MG: 60 TABLET ORAL at 08:15

## 2024-09-06 RX ADMIN — FAMOTIDINE 20 MG: 10 INJECTION, SOLUTION INTRAVENOUS at 11:09

## 2024-09-06 RX ADMIN — ACETAMINOPHEN 650 MG: 325 TABLET ORAL at 22:48

## 2024-09-06 RX ADMIN — ALLOPURINOL 300 MG: 300 TABLET ORAL at 08:20

## 2024-09-06 RX ADMIN — MIDAZOLAM 2 MG: 1 INJECTION INTRAMUSCULAR; INTRAVENOUS at 15:29

## 2024-09-06 RX ADMIN — Medication 3 G: at 15:45

## 2024-09-06 RX ADMIN — HYDROMORPHONE HYDROCHLORIDE 0.4 MG: 0.2 INJECTION, SOLUTION INTRAMUSCULAR; INTRAVENOUS; SUBCUTANEOUS at 02:42

## 2024-09-06 RX ADMIN — ONDANSETRON 4 MG: 2 INJECTION INTRAMUSCULAR; INTRAVENOUS at 15:55

## 2024-09-06 RX ADMIN — ACETAMINOPHEN 975 MG: 325 TABLET ORAL at 15:26

## 2024-09-06 RX ADMIN — DEXMEDETOMIDINE HYDROCHLORIDE 20 MCG: 100 INJECTION, SOLUTION INTRAVENOUS at 15:29

## 2024-09-06 RX ADMIN — PROPOFOL 50 MCG/KG/MIN: 10 INJECTION, EMULSION INTRAVENOUS at 15:40

## 2024-09-06 RX ADMIN — ONDANSETRON 4 MG: 4 TABLET, ORALLY DISINTEGRATING ORAL at 10:57

## 2024-09-06 RX ADMIN — FAMOTIDINE 20 MG: 10 INJECTION, SOLUTION INTRAVENOUS at 00:42

## 2024-09-06 RX ADMIN — TAMSULOSIN HYDROCHLORIDE 0.4 MG: 0.4 CAPSULE ORAL at 08:20

## 2024-09-06 RX ADMIN — ONDANSETRON 4 MG: 4 TABLET, ORALLY DISINTEGRATING ORAL at 02:53

## 2024-09-06 RX ADMIN — SODIUM CHLORIDE, POTASSIUM CHLORIDE, SODIUM LACTATE AND CALCIUM CHLORIDE: 600; 310; 30; 20 INJECTION, SOLUTION INTRAVENOUS at 05:00

## 2024-09-06 RX ADMIN — HYDROMORPHONE HYDROCHLORIDE 0.3 MG: 0.2 INJECTION, SOLUTION INTRAMUSCULAR; INTRAVENOUS; SUBCUTANEOUS at 06:17

## 2024-09-06 RX ADMIN — HYDROMORPHONE HYDROCHLORIDE 1 MG: 2 TABLET ORAL at 10:46

## 2024-09-06 RX ADMIN — CYCLOSPORINE 1 DROP: 0.5 EMULSION OPHTHALMIC at 20:42

## 2024-09-06 RX ADMIN — DULOXETINE HYDROCHLORIDE 40 MG: 20 CAPSULE, DELAYED RELEASE ORAL at 08:15

## 2024-09-06 RX ADMIN — HYDROXYCHLOROQUINE SULFATE 200 MG: 200 TABLET, FILM COATED ORAL at 08:15

## 2024-09-06 RX ADMIN — CEFTRIAXONE SODIUM 2 G: 2 INJECTION, POWDER, FOR SOLUTION INTRAMUSCULAR; INTRAVENOUS at 04:12

## 2024-09-06 RX ADMIN — HYDROMORPHONE HYDROCHLORIDE 0.5 MG: 0.2 INJECTION, SOLUTION INTRAMUSCULAR; INTRAVENOUS; SUBCUTANEOUS at 00:16

## 2024-09-06 RX ADMIN — CYCLOSPORINE 1 DROP: 0.5 EMULSION OPHTHALMIC at 08:15

## 2024-09-06 ASSESSMENT — ACTIVITIES OF DAILY LIVING (ADL)
ADLS_ACUITY_SCORE: 42
ADLS_ACUITY_SCORE: 46
ADLS_ACUITY_SCORE: 42
ADLS_ACUITY_SCORE: 35
ADLS_ACUITY_SCORE: 42
ADLS_ACUITY_SCORE: 44
ADLS_ACUITY_SCORE: 42
ADLS_ACUITY_SCORE: 46
ADLS_ACUITY_SCORE: 42
ADLS_ACUITY_SCORE: 46
ADLS_ACUITY_SCORE: 46
ADLS_ACUITY_SCORE: 44
ADLS_ACUITY_SCORE: 46
ADLS_ACUITY_SCORE: 35
ADLS_ACUITY_SCORE: 46
ADLS_ACUITY_SCORE: 35
ADLS_ACUITY_SCORE: 46
ADLS_ACUITY_SCORE: 37
ADLS_ACUITY_SCORE: 46
ADLS_ACUITY_SCORE: 45
ADLS_ACUITY_SCORE: 42
ADLS_ACUITY_SCORE: 46
ADLS_ACUITY_SCORE: 37

## 2024-09-06 ASSESSMENT — ENCOUNTER SYMPTOMS: DYSRHYTHMIAS: 1

## 2024-09-06 NOTE — ANESTHESIA CARE TRANSFER NOTE
Patient: Binabillie Miller    Procedure: Procedure(s):  CYSTOSCOPY, WITH LEFT RETROGRADE PYELOGRAM AND LEFT URETERAL STENT REPLACEMENT       Diagnosis: Obstructive uropathy [N13.9]  Diagnosis Additional Information: No value filed.    Anesthesia Type:   MAC     Note:    Oropharynx: oropharynx clear of all foreign objects and spontaneously breathing  Level of Consciousness: drowsy  Oxygen Supplementation: nasal cannula  Level of Supplemental Oxygen (L/min / FiO2): 3  Independent Airway: airway patency satisfactory and stable  Dentition: dentition unchanged  Vital Signs Stable: post-procedure vital signs reviewed and stable  Report to RN Given: handoff report given  Patient transferred to: Medical/Surgical Unit    Handoff Report: Identifed the Patient, Identified the Reponsible Provider, Reviewed the pertinent medical history, Discussed the surgical course, Reviewed Intra-OP anesthesia mangement and issues during anesthesia, Set expectations for post-procedure period and Allowed opportunity for questions and acknowledgement of understanding  Vitals:  Vitals Value Taken Time   BP     Temp     Pulse     Resp     SpO2         Electronically Signed By: WILDA Wesley CRNA  September 6, 2024  4:16 PM

## 2024-09-06 NOTE — OP NOTE
Date of surgery: 9/6/2024    Location: [facility]    Operating room:     Surgeon: Dr. Fernando Briceno.     Assistant: None    Anesthesia: MAC    Preoperative diagnosis: Left ureteral calculus    Postoperative diagnosis: Same    Procedure: Cystoscopy, left retrograde pyelogram, left ureteral stent insertion    Operative indication: Bina Miller is a 58 year old female with intractable pain secondary to a 5 mm left proximal ureteral calculus.  The has an elevated INR.  She presents now for stent placement.    Operative findings: The stent was placed without difficulty.    Operative procedure: The patient was brought to the operating room.  Monitored anesthetic care was administered.  The patient was placed in lithotomy position and prepared and draped in sterile fashion.  I introduced a 22 Bahraini cystoscope per urethra into the bladder.  There were no lesions seen within the bladder.  The left ureteral orifice is cannulated with a 5 Bahraini open-ended ureteral catheter and a retrograde pyelogram was obtained by injecting approximately 10 mL of 50% dilute contrast media.  There appears to be a radiopaque filling defect in the left proximal ureter.  A Inkd[employer]comson wire was advanced into the kidney.  A 6 Bahraini by 26 cm double-J stent is placed.  It is seen to coil nicely in the kidney by fluoroscopy and in the bladder by direct vision.  The bladder was emptied and procedure terminated.    Drains: Stent in the left ureter    Specimens: None    Estimated blood loss: None    Complications: None    Fernando Briceno MD

## 2024-09-06 NOTE — H&P
[facility]    History and Physical - Hospitalist Service       Date of Admission:  9/5/2024    Assessment & Plan      Bina Miller is a 58 year old female history significant for hypertension, rheumatoid arthritis, gout, seasonal allergies, kidney stones, morbid obesity, paroxysmal atrial fibrillation, sleep apnea and other medical comorbidities who is admitted with left lower quadrant pain due to obstructive uropathy with acute renal failure.         Patient Active Problem List   Diagnosis    Swelling of limb    Venous hypertension of lower extremity, bilateral    Rheumatoid arthritis (H)    Connective tissue disease (H24)    Perforating folliculitis    Morbid obesity with BMI of 60.0-69.9, adult (H)    Allergic rhinitis    Bilateral hand pain    Celiac disease    Chronic low back pain    Hypertension    Lymphedema    Migraine    SERA (obstructive sleep apnea)    Polyarthritis    Obstructive uropathy        Obstructive uropathy-patient has left kidney stones which is causing obstruction at this time.  Pain control for now.  Urology consulted.  Optimize hydration.    Left kidney stones-plan as above    Acute on chronic acute renal failure-gentle hydration.  Likely due to obstruction.    History of gout-continue allopurinol    Hypertension. -Hold losartan.  Continue verapamil.  Will add hydralazine as needed     Chronic pain syndrome-pain control as needed    Rheumatoid arthritis-resume Plaquenil    Morbid obesity weight loss needed outpatient follow-up-    Possible UTI-ongoing urinary frequency-urine on unimpressive but patient with ongoing symptoms and radiological evidence of possible pyelonephritis.  Patient started on ceftriaxone.  A.m. 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 she ready  DVT Prophylaxis: Pneumatic Compression Devices  Shea Catheter: Not present  Lines: None     Cardiac Monitoring: None  Code Status:  Full code    Clinically Significant Risk Factors Present on Admission                  # Hypertension: Noted on problem list         # Severe Obesity: Estimated body mass index is 68.42 kg/m  as calculated from the following:    Height as of this encounter: 1.727 m (5' 8\").    Weight as of this encounter: 204.1 kg (450 lb).                    Disposition Plan     Medically Ready for Discharge: Anticipated in 2-4 Days           Susanna Malone MD  Hospitalist Service  [facility]  Securely message with [employer] (more info)  Text page via [facility] Paging/Directory     ______________________________________________________________________    Chief Complaint   Left lower quadrant pain        History of Present Illness   Bina Miller is a 58 year old female history significant for hypertension, rheumatoid arthritis, gout, seasonal allergies, kidney stones, morbid obesity, paroxysmal atrial fibrillation, sleep apnea and other medical comorbidities who is admitted with left lower quadrant pain due to obstructive uropathy with acute renal failure.        Patient was seen in the ER on admission.  She was awake alert oriented to place person and time and could provide a history.    Per report, patient was brought to the ED by EMS from home, reporting LLQ pain rated 6/10. She denied any recent injury and last ate the previous evening at 1800. Additionally, she experienced nausea and one episode of dry heaving. The pain worsened with movement.      Preliminary labs showed a BUN of 20.1 and creatinine of 1.27 with a GFR of 48 which appeared to be new.  Lactic acid was 1.5.  UA showed mild ketones without signs of infection     A CT scan of the abdomen and pelvis revealed a 5 x 2 x 4 mm obstructive left proximal ureteral calculus, located just distal to the " ureteropelvic junction, with proximal hydroureteronephrosis. Perinephric and periureteral stranding were noted, likely due to inflammation from the obstruction, though a superimposed urinary tract infection or pyelonephritis could not be fully excluded. Scattered colonic diverticulosis was also identified, without evidence of diverticulitis. A urinalysis and clinical correlation were recommended for further evaluation.        ER intervention included starting patient on IV fluids   And pain control.  Patient is being admitted for urology intervention in the morning.          Past Medical History    Active Problems  Reconcile with Patient's ChartActive Problems  Problem Noted Date Diagnosed Date   Monitoring for long-term anticoagulant use 05/03/2024     Chronic atrial fibrillation 01/05/2024     Paroxysmal atrial fibrillation 01/04/2024     Class 3 obesity 08/17/2023     BMI 70 and over, adult 08/17/2023     Bilateral hand pain 01/09/2019     Perforating folliculitis 03/04/2015     SERA (obstructive sleep apnea) 08/03/2011     Overview:    Formatting of this note might be different from the original.  Sleep study 2011, could not tolerate CPAP   Allergic rhinitis 03/05/2009     Overview:    Formatting of this note might be different from the original.  And chronic sinusitis; positive to cat allergy  PFT show no RAD; mild restrictive component due to obesity.   Hypertension 11/14/2008     Lymphedema 11/14/2008     Connective tissue disease       Chronic low back pain       Polyarthritis       Migraine       Lupus       Overview:    Formatting of this note might be different from the original.  Negative lupus anticoagulant/ [facility] 6/07   Celiac disease       Idiopathic chronic gout, unspecified site, without tophus (tophi)     Medical History  Medical History Date Comments   Connective tissue disease (HRC)       Chronic low back pain       Polyarthritis       Migraine       Lupus ([facility]C)   Negative lupus anticoagulant/ [facility]  6/07   Celiac disease       Dysfunctional uterine bleeding       Allergic rhinitis 3/5/2009         Past Surgical History   Past Surgical History:   Procedure Laterality Date    LAPAROSCOPIC HYSTERECTOMY SUPRACERVICAL      TONSILLECTOMY      as child     WISDOM TOOTH EXTRACTION     Surgical History    Surgical History  Surgery Date Site/Laterality Comments   hysteroscopy, D&C 2006       TONSILLECTOMY PRIM/SEC; AGE 10/OVER         wisdom teeth extraction, oral surgery 1980       [facility] with BSO 7/2007   cervix intact    oral surgery, teeth removal (not wisdom teeth) 1976           Prior to Admission Medications   Prior to Admission Medications   Prescriptions Last Dose Informant Patient Reported? Taking?   LOTEMAX 0.5 % DrpG   Yes No   Sig: [LOTEMAX 0.5 % DRPG] as needed.   RESTASIS 0.05 % ophthalmic emulsion   Yes No   Sig: [RESTASIS 0.05 % OPHTHALMIC EMULSION]    allopurinol (ZYLOPRIM) 300 MG tablet   Yes No   Sig: [ALLOPURINOL (ZYLOPRIM) 300 MG TABLET] Take 300 mg by mouth daily.   fluticasone (FLONASE) 50 mcg/actuation nasal spray   Yes No   Sig: [FLUTICASONE (FLONASE) 50 MCG/ACTUATION NASAL SPRAY]    hydroxychloroquine (PLAQUENIL) 200 mg tablet   Yes No   Sig: [HYDROXYCHLOROQUINE (PLAQUENIL) 200 MG TABLET] Take 200 mg by mouth 2 (two) times a day.   losartan (COZAAR) 25 MG tablet   Yes No   Sig: [LOSARTAN (COZAAR) 25 MG TABLET] Take 25 mg by mouth daily.   verapamil (CALAN-SR) 180 MG CR tablet   Yes No   Sig: [VERAPAMIL (CALAN-SR) 180 MG CR TABLET] Take 180 mg by mouth daily.      Facility-Administered Medications: None        Review of Systems    The 10 point Review of Systems is negative other than noted in the HPI or here.     Social History   I have reviewed this patient's social history and updated it with pertinent information if needed.  Social History     Tobacco Use    Smoking status: Never    Smokeless tobacco: Never   Substance Use Topics    Alcohol use: No    Drug use: No         Family History   I  have reviewed this patient's family history and updated it with pertinent information if needed.  Family History   Problem Relation Age of Onset    Cancer Father         pancreatic    Melanoma Mother     Asthma Mother     Atrial fibrillation Mother     Rheumatoid Arthritis Mother     Rheumatoid Arthritis Brother     No Known Problems Brother    Family History  Medical History Relation Name Comments   Cancer, Other Birth Father   bladder cancer   Anesthesia Reaction Birth Mother       Asthma Birth Mother       Hypertension Birth Mother       Thyroid Disorder Birth Mother       Diabetes Maternal Grandfather       Hypertension Maternal Grandmother       Cancer, Colon Other 1   Great maternal aunt   Other Other 2   BRAIN CA-- PATERNAL GRANDFATHER   Cancer, Other Paternal Grandfather       Cancer, Breast Negative Family History       Cancer, Ovary Negative Family History         Family History  Relation Name Status Comments   Birth Father   Alive     Birth Mother   Alive     Brother 1   Alive     Brother 2   Alive     Maternal Grandfather         Maternal Grandmother         Other 1         Other 2         Paternal Grandfather             Allergies   Allergies   Allergen Reactions    Ace Inhibitors Cough    Ibuprofen Hives    Keflex [Cephalexin] Other (See Comments) and Headache     Light sensitivity    Novocain [Procaine] Hives    Penicillins Other (See Comments)     Patient does not take as she has a strong family history anaphylaxis in the family.        Physical Exam   Vital Signs: Temp: 97.2  F (36.2  C)   BP: (!) 183/85 Pulse: 84   Resp: 18 SpO2: 96 %      Weight: 450 lbs 0 oz      General Aox3, appropriate affect, NAD, on RA, Morbidly obese  Chest Adeq E b/l, No wheezing  Heart RRR, No M/R/G  Abd- Soft, NT, BS+  left flank pain  - Deferred,   Extremity- Moving all extremities, No digital clubbing,   No edema  Neuro- Aox3, moving all extremities, gait not checked  Skin  Has no tattoo, No skin rash     Medical  Decision Making       30 MINUTES SPENT BY ME on the date of service doing chart review, history, exam, documentation & further activities per the note.      ------------------ MEDICAL DECISION MAKING ------------------------------------------------------------------------------------------------------  MANAGEMENT DISCUSSED with the following over the past 24 hours: patient and care team       Data   ------------------------- PAST 24 HR DATA REVIEWED -----------------------------------------------    I have personally reviewed the following data over the past 24 hrs:    11.9 (H)  \   13.9   / 212     136 99 20.1 /  121 (H)   4.6 23 1.27 (H) \     ALT: 18 AST: 22 AP: 77 TBILI: 1.0   ALB: 4.4 TOT PROTEIN: 7.8 LIPASE: 24     Procal: N/A CRP: N/A Lactic Acid: 1.5         Imaging results reviewed over the past 24 hrs:   Recent Results (from the past 24 hour(s))   CT Abdomen Pelvis w Contrast    Narrative    EXAM: CT ABDOMEN PELVIS W CONTRAST  LOCATION: [facility]  DATE: 9/5/2024    INDICATION: LLQ pain  COMPARISON: None.  TECHNIQUE: CT scan of the abdomen and pelvis was performed following injection of IV contrast. Multiplanar reformats were obtained. Dose reduction techniques were used.  CONTRAST: 75 mL Isovue 370    FINDINGS:   LOWER CHEST: Discoid atelectasis is seen in the lingula.    HEPATOBILIARY: No significant mass or bile duct dilatation. No calcified gallstones.     PANCREAS: No significant mass, duct dilatation, or inflammatory change.    SPLEEN: Normal size.    ADRENAL GLANDS: No significant nodules.    KIDNEYS/BLADDER: There is an obstructive 5 x 2 x 4 mm calculi seen in the left proximal ureter just distal to the ureteropelvic junction (image 120, series 3; image 62, series 4) with proximal hydroureteronephrosis and perinephric as well as periureteral   stranding. The remainder of the ureter tapers normally throughout its course. The right kidney and ureter are  unremarkable.    BOWEL: Diverticulosis of the colon. No acute inflammatory change. No obstruction.     LYMPH NODES: No lymphadenopathy.    VASCULATURE: No abdominal aortic aneurysm.    PELVIC ORGANS: No pelvic masses.    MUSCULOSKELETAL: Unremarkable.      Impression    IMPRESSION:   1.  Obstructive 5 x 2 x 4 mm left proximal ureteral calculi, just distal to the ureteropelvic junction, with proximal hydroureteronephrosis. Additionally there is perinephric and periureteral stranding which is likely due to inflammation secondary to the   obstructed ureter although superimposed urinary tract is and/or pyelonephritis cannot be completely excluded. Correlation with urinalysis and symptomatology is recommended.  2.  Scattered colonic diverticulosis without evidence of diverticulitis

## 2024-09-06 NOTE — PLAN OF CARE
Goal Outcome Evaluation:      Plan of Care Reviewed With: patient    Overall Patient Progress: improvingOverall Patient Progress: improving    Outcome Evaluation: Pain 4 to 5/10 at rest, controlled with oral dilaudid.  Intermittent nausea with activity, no emesis.

## 2024-09-06 NOTE — ED NOTES
"[facility] ED Handoff Report    ED Chief Complaint: Abdominal pain    ED Diagnosis:  (R10.32) LLQ abdominal pain    (N20.1) Ureterolithiasis    (R11.0) Nausea      PMH:  No past medical history on file.     Code Status:  Full Code     Falls Risk: Yes Band: Applied    Current Living Situation/Residence: lives alone     Elimination Status: Continent: Yes     Activity Level: 2 assist    Patients Preferred Language:  English     Needed: No    Vital Signs:  BP (!) 163/88   Pulse 95   Temp 97.2  F (36.2  C)   Resp 18   Ht 1.727 m (5' 8\")   Wt (!) 204.1 kg (450 lb)   SpO2 95%   BMI 68.42 kg/m       Cardiac Rhythm: NSR, hx of chronic A-Fib    Pain Score: 4/10    Is the Patient Confused:  No    Last Food or Drink: 9/5/24     Focused Assessment: Pt presented to ED with EMS. Coming from home. LLQ pain 6/10. Denies any injury. Last ate 1800 yesterday. + nausea, 1 episode of dry heaving. Pain is worse with movement. Patient was found to have obstructing kidney stone. She uses a wheelchair to get to the bathroom, able to ambulate. Becomes Sob with exertion.     Tests Performed: Done: Labs and Imaging    Treatments Provided:  pain medication    Family Dynamics/Concerns: No    Family Updated On Visitor Policy: No    Plan of Care Communicated to Family: No    Who Was Updated about Plan of Care: patient    Belongings Checklist Done and Signed by Patient: Yes      Covid: asymptomatic , negative    RN: Anais Barroso 9/6/2024 12:45 AM        "

## 2024-09-06 NOTE — MEDICATION SCRIBE - ADMISSION MEDICATION HISTORY
Medication Scribe Admission Medication History    Admission medication history is complete. The information provided in this note is only as accurate as the sources available at the time of the update.    Information Source(s): Patient via iPad    Pertinent Information:     Changes made to PTA medication list:  Added: Naltrexone, sysane, Allegra, Duloxetine, Warfarin (per fill history and patient)                        Deleted: None  Changed: None    Allergies reviewed with patient and updates made in EHR: yes    Medication History Completed By: Edvin Saba 9/6/2024 12:43 AM    PTA Med List   Medication Sig Last Dose    allopurinol (ZYLOPRIM) 300 MG tablet [ALLOPURINOL (ZYLOPRIM) 300 MG TABLET] Take 300 mg by mouth daily. 9/4/2024 at am    DULoxetine (CYMBALTA) 20 MG capsule Take 2 capsules by mouth daily. 9/4/2024 at am    fexofenadine (ALLEGRA) 180 MG tablet Take 180 mg by mouth daily. 9/4/2024 at am    fluticasone (FLONASE) 50 mcg/actuation nasal spray Spray 1 spray into both nostrils daily. 9/4/2024 at am    hydroxychloroquine (PLAQUENIL) 200 mg tablet [HYDROXYCHLOROQUINE (PLAQUENIL) 200 MG TABLET] Take 200 mg by mouth 2 (two) times a day. 9/4/2024 at pm    losartan (COZAAR) 25 MG tablet [LOSARTAN (COZAAR) 25 MG TABLET] Take 25 mg by mouth daily. 9/4/2024 at am    LOTEMAX 0.5 % DrpG [LOTEMAX 0.5 % DRPG] as needed. Unknown at prn    Naltrexone HCl, Pain, 4.5 MG CAPS Take 1 capsule by mouth daily. 9/4/2024 at am    RESTASIS 0.05 % ophthalmic emulsion Place 1 drop into both eyes 2 times daily. 9/4/2024 at pm    verapamil (CALAN-SR) 180 MG CR tablet Take 180 mg by mouth at bedtime. 9/4/2024 at hs    warfarin ANTICOAGULANT (COUMADIN) 5 MG tablet Take 10 mg by mouth every 7 days. Take two tablet (10 mg) on Thursday 9/4/2024 at pm    warfarin ANTICOAGULANT (COUMADIN) 5 MG tablet Take 7.5 mg by mouth six times a week. Take one and half (7.5 mg) on Monday, Tuesday, Wednesday, Friday, Saturday and Sunday.  9/4/2024 at pm

## 2024-09-06 NOTE — CONSULTS
"  [facility] CONSULTATION    Type of Consult: emergency room  Place of Service: [facility]   Reason for consult: 5mm proximal left ureteral stone   Request for consult by: Dr. Malone    History of present illness:   Bina Miller is a 58 year old female that was admitted for left flank pain. Urology is being consulted for see above. History obtained through patient and chart review.     59yo F with history of morbid obesity, atrial fibrillation on warfarin, rheumatoid arthritis, SERA, and celiac disease who presented to the emergency department for left flank pain. Beginning on Wednesday, she noticed pain in the left side of her back that wrapped around to the front side of her abdomen. She then began to notice nausea and chills, no vomiting. She reports her temperature \"runs low\" so any elevations are \"a fever for her.\" No temperatures measured over 98 degrees. She denies any dysuria or hematuria but has noticed an increased in urinary frequency over the last few days. The pain worsened yesterday so she called an ambulance to come to the hospital. Since being in the ER, she reports her pain is still a 5/10 while laying in bed and 8/10 when she ambulates to the bathroom. Last dose of Warfarin on Wednesday night. Last ate on Wednesday night.     Past Medical History:  No past medical history on file.    Past Surgical History:  Past Surgical History:   Procedure Laterality Date    LAPAROSCOPIC HYSTERECTOMY SUPRACERVICAL      TONSILLECTOMY      as child     WISDOM TOOTH EXTRACTION         Social History:  Social History     Socioeconomic History    Marital status: Single     Spouse name: Not on file    Number of children: Not on file    Years of education: Not on file    Highest education level: Not on file   Occupational History    Not on file   Tobacco Use    Smoking status: Never    Smokeless tobacco: Never   Substance and Sexual Activity    Alcohol use: No    Drug use: No    Sexual activity: Never "   Other Topics Concern    Not on file   Social History Narrative     for [employer].  Single.  No kids.  Living with mother to help her.     Social Determinants of Health     Financial Resource Strain: Not on file   Food Insecurity: Not on file   Transportation Needs: Not on file   Physical Activity: Not on file   Stress: Not on file   Social Connections: Not on file   Interpersonal Safety: Not on file   Housing Stability: Not on file       Medications:  Current Facility-Administered Medications   Medication Dose Route Frequency Provider Last Rate Last Admin    allopurinol (ZYLOPRIM) tablet 300 mg  300 mg Oral Daily Susanna Malone MD   300 mg at 09/06/24 0820    calcium carbonate (TUMS) chewable tablet 1,000 mg  1,000 mg Oral 4x Daily PRN Susanna Malone MD        cefTRIAXone (ROCEPHIN) 2 g vial to attach to  ml bag for ADULTS or NS 50 ml bag for PEDS  2 g Intravenous Q24H Susanna Malone MD   Stopped at 09/06/24 0500    cycloSPORINE (RESTASIS) 0.05 % ophthalmic emulsion 1 drop  1 drop Both Eyes BID Susanna Malone MD   1 drop at 09/06/24 0815    glucose gel 15-30 g  15-30 g Oral Q15 Min PRN Susanna Malone MD        Or    dextrose 50 % injection 25-50 mL  25-50 mL Intravenous Q15 Min PRN Susanna Malone MD        Or    glucagon injection 1 mg  1 mg Subcutaneous Q15 Min PRN Susanna Malone MD        DULoxetine (CYMBALTA) DR capsule 40 mg  40 mg Oral Daily Susanna Malone MD   40 mg at 09/06/24 0815    famotidine (PEPCID) injection 20 mg  20 mg Intravenous Q12H Susanna Maloen MD   20 mg at 09/06/24 1109    fexofenadine (ALLEGRA) tablet 180 mg  180 mg Oral Daily Susanna Malone MD   180 mg at 09/06/24 0815    hydrALAZINE (APRESOLINE) injection 5 mg  5 mg Intravenous Q4H PRN Susanna Malone MD        HYDROmorphone (DILAUDID) half-tab 1 mg  1 mg Oral Q4H PRN Karol Mccord MD   1 mg at 09/06/24 1046    HYDROmorphone (DILAUDID) injection 0.3 mg  0.3 mg Intravenous Q3H PRN Susanna Malone MD   0.3  mg at 09/06/24 0617    hydroxychloroquine (PLAQUENIL) tablet 200 mg  200 mg Oral BID Susanna Malone MD   200 mg at 09/06/24 0815    lactated ringers infusion   Intravenous Continuous PonnalaKarol MD 75 mL/hr at 09/06/24 0852 Rate Change at 09/06/24 0852    lidocaine (LMX4) cream   Topical Q1H PRN Susanna Malone MD        lidocaine 1 % 0.1-1 mL  0.1-1 mL Other Q1H PRN Susanna Malone MD        naloxone (NARCAN) injection 0.2 mg  0.2 mg Intravenous Q2 Min PRN Susanna Malone MD        Or    naloxone (NARCAN) injection 0.4 mg  0.4 mg Intravenous Q2 Min PRN Susanna Malone MD        Or    naloxone (NARCAN) injection 0.2 mg  0.2 mg Intramuscular Q2 Min PRN Susanna Malone MD        Or    naloxone (NARCAN) injection 0.4 mg  0.4 mg Intramuscular Q2 Min PRN Susanan Malone MD        Naltrexone HCl (Pain) CAPS 1 capsule  1 capsule Oral Daily Susanna Malone MD        ondansetron (ZOFRAN ODT) ODT tab 4 mg  4 mg Oral Q6H PRN Susanna Malone MD   4 mg at 09/06/24 1057    Or    ondansetron (ZOFRAN) injection 4 mg  4 mg Intravenous Q6H PRN Susanna Malone MD        senna-docusate (SENOKOT-S/PERICOLACE) 8.6-50 MG per tablet 1 tablet  1 tablet Oral BID PRN Susanna Malone MD        Or    senna-docusate (SENOKOT-S/PERICOLACE) 8.6-50 MG per tablet 2 tablet  2 tablet Oral BID PRN Susanna Malone MD        sodium chloride (PF) 0.9% PF flush 3 mL  3 mL Intracatheter Q8H Susanna Malone MD        sodium chloride (PF) 0.9% PF flush 3 mL  3 mL Intracatheter q1 min prn Susanna Malone MD   3 mL at 09/06/24 0416    tamsulosin (FLOMAX) capsule 0.4 mg  0.4 mg Oral Daily Susanna Malone MD   0.4 mg at 09/06/24 0820    verapamil ER (CALAN-SR) CR tablet 180 mg  180 mg Oral At Bedtime Susanna Malone MD         Current Outpatient Medications   Medication Sig Dispense Refill    allopurinol (ZYLOPRIM) 300 MG tablet [ALLOPURINOL (ZYLOPRIM) 300 MG TABLET] Take 300 mg by mouth daily.      DULoxetine (CYMBALTA) 20 MG capsule Take 2 capsules by  mouth daily.      fexofenadine (ALLEGRA) 180 MG tablet Take 180 mg by mouth daily.      fluticasone (FLONASE) 50 mcg/actuation nasal spray Spray 1 spray into both nostrils daily.      hydroxychloroquine (PLAQUENIL) 200 mg tablet [HYDROXYCHLOROQUINE (PLAQUENIL) 200 MG TABLET] Take 200 mg by mouth 2 (two) times a day.      losartan (COZAAR) 25 MG tablet [LOSARTAN (COZAAR) 25 MG TABLET] Take 25 mg by mouth daily.      LOTEMAX 0.5 % DrpG [LOTEMAX 0.5 % DRPG] as needed.      Naltrexone HCl, Pain, 4.5 MG CAPS Take 1 capsule by mouth daily.      RESTASIS 0.05 % ophthalmic emulsion Place 1 drop into both eyes 2 times daily.      verapamil (CALAN-SR) 180 MG CR tablet Take 180 mg by mouth at bedtime.      warfarin ANTICOAGULANT (COUMADIN) 5 MG tablet Take 10 mg by mouth every 7 days. Take two tablet (10 mg) on Thursday      warfarin ANTICOAGULANT (COUMADIN) 5 MG tablet Take 7.5 mg by mouth six times a week. Take one and half (7.5 mg) on Monday, Tuesday, Wednesday, Friday, Saturday and Sunday.         Allergies:   Allergies   Allergen Reactions    Ace Inhibitors Cough    Ibuprofen Hives    Keflex [Cephalexin] Other (See Comments) and Headache     Light sensitivity    Novocain [Procaine] Hives    Penicillins Other (See Comments)     Patient does not take as she has a strong family history anaphylaxis in the family.       Review of Systems:   A full 12 point review of systems was taken and is negative aside from what is noted above in the HPI    Physical Exam:  Temp:  [97.2  F (36.2  C)-98.7  F (37.1  C)] 98  F (36.7  C)  Pulse:  [] 102  Resp:  [18-26] 18  BP: (143-183)/(67-97) 150/72  SpO2:  [90 %-98 %] 93 %  General: NAD, alert, cooperative  Head: normocephalic, without abnormality / atraumatic  Abdomen: soft, tender to LLQ, non distended. no suprapubic fullness/tenderness. left CVA tenderness noted  Geniturinary: voiding on her own  Skin: no rashes or lesions  Musculoskeletal: moves extremities equally  Psychological:  alert and oriented, answers questions appropriately      Labs:   Creatinine   Date Value Ref Range Status   09/06/2024 1.41 (H) 0.51 - 0.95 mg/dL Final       Lab Results   Component Value Date    WBC 10.7 09/06/2024     Lab Results   Component Value Date    HGB 12.9 09/06/2024     Lab Results   Component Value Date     09/06/2024       UA RESULTS:  Recent Labs   Lab Test 09/05/24  2152   COLOR Light Yellow   APPEARANCE Clear   URINEGLC Negative   URINEBILI Negative   URINEKETONE 20*   SG 1.019   UBLD 0.06 mg/dL*   URINEPH 7.5*   PROTEIN Negative   NITRITE Negative   LEUKEST Negative   RBCU 3*   WBCU <1         Urine culture: pending    Lab Results: personally reviewed     Imaging:  EXAM: CT ABDOMEN PELVIS W CONTRAST  LOCATION: [facility]  DATE: 9/5/2024     INDICATION: LLQ pain  COMPARISON: None.  TECHNIQUE: CT scan of the abdomen and pelvis was performed following injection of IV contrast. Multiplanar reformats were obtained. Dose reduction techniques were used.  CONTRAST: 75 mL Isovue 370     FINDINGS:   LOWER CHEST: Discoid atelectasis is seen in the lingula.     HEPATOBILIARY: No significant mass or bile duct dilatation. No calcified gallstones.      PANCREAS: No significant mass, duct dilatation, or inflammatory change.     SPLEEN: Normal size.     ADRENAL GLANDS: No significant nodules.     KIDNEYS/BLADDER: There is an obstructive 5 x 2 x 4 mm calculi seen in the left proximal ureter just distal to the ureteropelvic junction (image 120, series 3; image 62, series 4) with proximal hydroureteronephrosis and perinephric as well as periureteral   stranding. The remainder of the ureter tapers normally throughout its course. The right kidney and ureter are unremarkable.     BOWEL: Diverticulosis of the colon. No acute inflammatory change. No obstruction.      LYMPH NODES: No lymphadenopathy.     VASCULATURE: No abdominal aortic aneurysm.     PELVIC ORGANS: No pelvic masses.      MUSCULOSKELETAL: Unremarkable.                                                                      IMPRESSION:   1.  Obstructive 5 x 2 x 4 mm left proximal ureteral calculi, just distal to the ureteropelvic junction, with proximal hydroureteronephrosis. Additionally there is perinephric and periureteral stranding which is likely due to inflammation secondary to the   obstructed ureter although superimposed urinary tract is and/or pyelonephritis cannot be completely excluded. Correlation with urinalysis and symptomatology is recommended.  2.  Scattered colonic diverticulosis without evidence of diverticulitis    I have personally reviewed the imaging reports above.     Assessment / Plan : Bina Miller is being seen by [facility] for 5mm proximal left ureteral stone     - 57yo F with history of atrial fibrillation on warfarin admitted for left flank pain found to have a 5mm obstructing left ureteral stone. UA with few bacteria and without nitrites. Creatinine 1.41. White count 10.7 and she is afebrile.   - Await urine culture and tailor antibiotics if indicated.  - Recommend Flomax 0.4mg if feasible.  - Supportive cares including: IV fluids, analgesics and antiemetics per primary team. Recommend scheduled pain regimen including Toradol if feasible   - Given INR of 2.7, unable to treat her stone today. Discussed ureteral stent placement and treatment of stone at a later date once her INR has normalized which she is agreeable to. Added to OR schedule at 3:15pm. Keep NPO.     This case was discussed with:  Dr Fernando Briceno     Thank you for consulting [facility] regarding this patient's care. Please contact us with questions or concerns.     Hermelinda Oscar PA-C  [facility]   [phone]

## 2024-09-06 NOTE — ANESTHESIA POSTPROCEDURE EVALUATION
Patient: Bina Miller    Procedure: Procedure(s):  CYSTOSCOPY, WITH LEFT RETROGRADE PYELOGRAM AND LEFT URETERAL STENT REPLACEMENT       Anesthesia Type:  MAC    Note:  Disposition: Inpatient   Postop Pain Control: Uneventful            Sign Out: Well controlled pain   PONV: No   Neuro/Psych: Uneventful            Sign Out: Acceptable/Baseline neuro status   Airway/Respiratory: Uneventful            Sign Out: Acceptable/Baseline resp. status   CV/Hemodynamics: Uneventful            Sign Out: Acceptable CV status; No obvious hypovolemia; No obvious fluid overload   Other NRE: NONE   DID A NON-ROUTINE EVENT OCCUR? No       Last vitals:  Vitals:    09/06/24 1200 09/06/24 1300 09/06/24 1450   BP:   109/73   Pulse: 101 (!) 128 (!) 123   Resp:   20   Temp:   36.8  C (98.3  F)   SpO2: 93% 93% 98%       Electronically Signed By: Fidel Gentile MD  September 6, 2024  4:40 PM

## 2024-09-06 NOTE — ED TRIAGE NOTES
Pt presents to ED with EMS. Coming from home. LLQ pain 6/10. Denies any injury. Last ate 1800 yesterday. + nausea, 1 episode of dry heaving. Pain is worse with movement.

## 2024-09-06 NOTE — PLAN OF CARE
Problem: Adult Inpatient Plan of Care  Goal: Plan of Care Review  Description: The Plan of Care Review/Shift note should be completed every shift.  The Outcome Evaluation is a brief statement about your assessment that the patient is improving, declining, or no change.  This information will be displayed automatically on your shift  note.  Outcome: Progressing  Flowsheets (Taken 9/6/2024 0738)  Plan of Care Reviewed With: patient  Overall Patient Progress: improving     Problem: Pain Acute  Goal: Optimal Pain Control and Function  Outcome: Progressing  Intervention: Develop Pain Management Plan  Recent Flowsheet Documentation  Taken 9/6/2024 0242 by Elyssa Smith, RN  Pain Management Interventions: medication (see MAR)  Intervention: Prevent or Manage Pain  Recent Flowsheet Documentation  Taken 9/6/2024 0258 by Elyssa Smith, RN  Medication Review/Management: medications reviewed     Problem: Fall Injury Risk  Goal: Absence of Fall and Fall-Related Injury  Outcome: Progressing  Intervention: Identify and Manage Contributors  Recent Flowsheet Documentation  Taken 9/6/2024 0258 by Elyssa Smith, RN  Medication Review/Management: medications reviewed  Intervention: Promote Injury-Free Environment  Recent Flowsheet Documentation  Taken 9/6/2024 0258 by Elyssa Smith, RN  Safety Promotion/Fall Prevention:   activity supervised   assistive device/personal items within reach   nonskid shoes/slippers when out of bed   patient and family education   safety round/check completed     Problem: Nausea and Vomiting  Goal: Nausea and Vomiting Relief  Outcome: Progressing  Intervention: Prevent and Manage Nausea and Vomiting  Recent Flowsheet Documentation  Taken 9/6/2024 0253 by Elyssa Smith, RN  Nausea/Vomiting Interventions: antiemetic   Goal Outcome Evaluation:      Plan of Care Reviewed With: patient    Overall Patient Progress: improvingOverall Patient Progress: improving     Pt complained of pain and nausea x2,  this comes more after walking abdomen pain. Pain and nausea meds given with good relief. Pt is on AFIB at times tachycardia, also oxygenation at times goes to 80s, pt says does not use CPAP, was informed will place oxygen if this happens when pt falls asleep. Pt was agreeable but then it was stable and oximeter was changed, pt resting well.

## 2024-09-06 NOTE — PHARMACY-ANTICOAGULATION SERVICE
"Chief Complaint   Patient presents with    Abdominal Pain     [facility]d [facility], CT revealed bowel obstruction. NG tube placed, pt given 0.5 mg dilaudid, 2L IVF, and \"unknown antibiotics\" x 2 PTA.        " Clinical Pharmacy - Warfarin Dosing Consult     Pharmacy has been consulted to manage this patient s warfarin therapy.  Indication: Atrial Fibrillation  Therapy Goal: INR 2-3  Warfarin Prior to Admission: Yes  Warfarin PTA Regimen: 7.5 mg on Thursday, 5 mg all other days of the week  Significant drug interactions: Verapamil  Recent documented change in oral intake/nutrition: Unknown  Dose Comments: 9/6/24 13:44 MD CANCELED WARFARIN PER PHARMACY - will readdress 9/7/24     INR   Date Value Ref Range Status   09/06/2024 2.73 (H) 0.85 - 1.15 Final       Pharmacy will monitor Bina Miller daily and order warfarin doses to achieve specified goal.      Please contact pharmacy as soon as possible if the warfarin needs to be held for a procedure or if the warfarin goals change.

## 2024-09-06 NOTE — PROGRESS NOTES
[facility]    Medicine Progress Note - Hospitalist Service    Date of Admission:  9/5/2024    Assessment & Plan   59 yo Female with PMH Lupus, connective tissue disease, rheumatoid arthritis, chronic low back pain, celiac disease, polyarthritis, HTN, chronic A-fib, morbid obesity presented with LLQ and flank pain, admitted for further evaluation    Obstructive uropathy  -CT shows obstructive 5 x 2 x 4 mm left proximal ureteral calculi, just distal to ureteropelvic junction with proximal hydroureteronephrosis  -Pain control - added prn Dilaudid  -On Tamsulosin 0.4mg, IV fluids  -Seen by Urology, appreciate recs  -due to INR at 2.7, plan for ureteral stent placement today and treat stone later.  -NPO     SAVAGE on CKD, uptrending  Likely due to obstruction, as above  Monitor I/o's  Monitor Cr    Possible UTI/Pyelonephritis  Presented with LLQ and left flank pain  Inc urinary frequency  UA not impressive for UTI  CRP elevated, procalcitonin negative  WBC downtrended  Imaging perinephric and periureteral stranding likely due to inflammation secondary to obstructed ureter versus superimposed UTI/pyelonephritis  Received IV ceftriaxone  Urine culture sent    History of gout  continue allopurinol    Hypertension  Hold losartan  Continue verapamil  hydralazine as needed     Chronic low back pain  On Duloxetine, Naltrexone at home - not available here  Prn Dilaudid    Rheumatoid arthritis  Lupus  Plaquenil    Morbid obesity     Obstructive sleep apnea  CPAP     Perforating folliculitis  noted to have erythematous papillomata's rash on bilateral arms    Chronic A.fib  INR 2.73  Coumadin held - for stent placement  PTA Verapamil (did not receive dose last night)    Incidental finding  Scattered colonic diverticulosis          Diet: NPO for Medical/Clinical Reasons Except for: Ice Chips    DVT Prophylaxis: Warfarin  Shea Catheter: Not present  Lines: None     Cardiac Monitoring: ACTIVE order. Indication:  "Tachyarrhythmias, acute (48 hours)  Code Status: Full Code      Clinically Significant Risk Factors Present on Admission               # Drug Induced Coagulation Defect: home medication list includes an anticoagulant medication    # Hypertension: Noted on problem list         # Severe Obesity: Estimated body mass index is 68.42 kg/m  as calculated from the following:    Height as of this encounter: 1.727 m (5' 8\").    Weight as of this encounter: 204.1 kg (450 lb).                    Disposition Plan     Medically Ready for Discharge: Anticipated Tomorrow             Karol Mccord MD  Hospitalist Service  [facility]  Securely message with Monitoring Division (more info)  Text page via [employer] Paging/Directory   ______________________________________________________________________    Interval History   Patient was examined at the bedside, new to me today.  Seen by urology, undergoing ureteral stent placement  Coumadin held    Physical Exam   Vital Signs: Temp: 98.1  F (36.7  C) Temp src: Oral BP: (!) 147/69 (RN NOTIFIED) Pulse: 107   Resp: 21 SpO2: 90 % O2 Device: None (Room air)    Weight: 450 lbs 0 oz    General AOx3, mod distress due to pain, Morbidly obese  Chest Distant, No wheezing  Heart RRR, No M/R/G  Abd- Soft, NT, BS+  left flank pain  Extremity- Moving all extremities, No digital clubbing, No edema  Neuro- Aox3, moving all extremities  Skin: erythematous papillomata's rash on bilateral arms    Medical Decision Making       50 MINUTES SPENT BY ME on the date of service doing chart review, history, exam, documentation & further activities per the note.      Data     I have personally reviewed the following data over the past 24 hrs:    10.7  \   12.9   / 223     137 100 19.4 /  117 (H)   4.6 25 1.41 (H) \     ALT: 17 AST: 17 AP: 69 TBILI: 0.9   ALB: 4.1 TOT PROTEIN: 7.2 LIPASE: 24     Procal: 0.10 CRP: 21.40 (H) Lactic Acid: 1.5       INR:  2.73 (H) PTT:  N/A   D-dimer:  N/A Fibrinogen:  N/A    "    Imaging results reviewed over the past 24 hrs:   Recent Results (from the past 24 hour(s))   CT Abdomen Pelvis w Contrast    Narrative    EXAM: CT ABDOMEN PELVIS W CONTRAST  LOCATION: [facility]  DATE: 9/5/2024    INDICATION: LLQ pain  COMPARISON: None.  TECHNIQUE: CT scan of the abdomen and pelvis was performed following injection of IV contrast. Multiplanar reformats were obtained. Dose reduction techniques were used.  CONTRAST: 75 mL Isovue 370    FINDINGS:   LOWER CHEST: Discoid atelectasis is seen in the lingula.    HEPATOBILIARY: No significant mass or bile duct dilatation. No calcified gallstones.     PANCREAS: No significant mass, duct dilatation, or inflammatory change.    SPLEEN: Normal size.    ADRENAL GLANDS: No significant nodules.    KIDNEYS/BLADDER: There is an obstructive 5 x 2 x 4 mm calculi seen in the left proximal ureter just distal to the ureteropelvic junction (image 120, series 3; image 62, series 4) with proximal hydroureteronephrosis and perinephric as well as periureteral   stranding. The remainder of the ureter tapers normally throughout its course. The right kidney and ureter are unremarkable.    BOWEL: Diverticulosis of the colon. No acute inflammatory change. No obstruction.     LYMPH NODES: No lymphadenopathy.    VASCULATURE: No abdominal aortic aneurysm.    PELVIC ORGANS: No pelvic masses.    MUSCULOSKELETAL: Unremarkable.      Impression    IMPRESSION:   1.  Obstructive 5 x 2 x 4 mm left proximal ureteral calculi, just distal to the ureteropelvic junction, with proximal hydroureteronephrosis. Additionally there is perinephric and periureteral stranding which is likely due to inflammation secondary to the   obstructed ureter although superimposed urinary tract is and/or pyelonephritis cannot be completely excluded. Correlation with urinalysis and symptomatology is recommended.  2.  Scattered colonic diverticulosis without evidence of diverticulitis

## 2024-09-06 NOTE — ED PROVIDER NOTES
EMERGENCY DEPARTMENT ENCOUNTER      NAME: Bina Miller  AGE: 58 year old female  YOB: 1965  MRN: [id number]  EVALUATION DATE & TIME: No admission date for patient encounter.    ED PROVIDER: Jennifer Grigsby MD    Chief Complaint   Patient presents with    Abdominal Pain    Nausea       FINAL IMPRESSION  1. LLQ abdominal pain    2. Ureterolithiasis    3. Nausea        MEDICAL DECISION MAKING   Bina Miller is a 58 year old female who presents for evaluation of LLQ and flank pain.  Outside records reviewed.  Patient has a history of morbid obesity, connective tissue disease, rheumatoid arthritis, chronic low back pain, celiac disease, hypertension, polyarthritis, atrial fibrillation, lupus.  Reviewed most recent clinic visit in June with PCP.  Today, she presents with complaints of left lower quadrant and flank pain that began yesterday evening.  She reports that since onset, has been getting progressively worse.  She had an episode of nausea but no actual vomiting.  She also reports feeling gassy.  Last night, she had an episode of loose stool.  She has no associated dysuria, hematuria, or other new complaints.  No fevers.  No history of kidney stones or kidney infections.  Only abdominal surgery is a hysterectomy remotely.    I considered a broad differential including but not limited to GERD, PUD, gastritis, pancreatitis, hepatobiliary disease, gastroenteritis, appendicitis, diverticulitis, ureterolithiasis, pyelonephritis, cystitis, hernia, small bowel obstruction/ileus, perforation, AAA, mesenteric ischemia. I have lower suspicion for symptoms secondary to cardiopulmonary or vascular process given history and exam. Also considered ovarian torsion, ovarian cyst, TOA, PID, and pain secondary to vagina/cervical infection but do feel gynecologic/ovarian etiology less likely.  Discussed options for workup with the patient. We agreed on plan for labs, UA, CT abdomen/pelvis, and management of symptoms with  IV fluids and IV analgesic/antiemetic.     CT revealed an obstructive 5 x 2 x 4 mm left proximal ureteral calculus just distal to the UPJ with proximal hydroureteronephrosis and perinephric and periureteral stranding, possibly related to inflammation or less likely, pyelonephritis.  No evidence of other acute intra-abdominal process.  UA did not show obvious evidence of infection, with negative nitrite, LE, 0 RBCs.  CMP with creatinine 1.27, suggestive of mild acute kidney injury.  No other electrolyte derangement, acidosis, or hepatobiliary disease.  Lactate negative, unlikely sepsis/bacteremia.  CBC with mild leukocytosis and WBC of 11.9, likely related to stress/demargination.  No acute anemia.  I rechecked the patient and reviewed results.  She had improvement in nausea after initial interventions but continued to complain of discomfort.  We discussed options for ongoing workup and management.  Patient is a bit uncomfortable prospect of going home today given severity of her symptoms.  With this, we agreed on plan for admission and urology consultation.  I discussed case with hospitalist who agreed to facilitate admission.    Considerations in Medical Decision Making  History:  Supplemental history from: N/A  External Record(s) reviewed: Documented in chart    Work Up:  Chart documentation includes differential considered and any EKGs or imaging independently interpreted by provider, where specified.  In additional to work up documented, I considered the following work up: Documented in chart, if applicable.    External consultation:  Discussion of management with another provider: Documented in chart, if applicable and Hospitalist    Complicating factors:  Care impacted by chronic illness: Chronic Pain, Heart Disease, and Hypertension  Care affected by social determinants of health: Access to Medical Care and No Support for Care at Home    Disposition considerations: Admit.        ED COURSE  9:30 PM I met with  the patient, obtained history, performed an initial exam, and discussed options and plan for diagnostics and treatment here in the ED.  12:05 AM I rechecked the patient.   12:37 AM I discussed the patient with Dr. Malone from the hospitalist service who agrees to admit the patient.      MEDICATIONS GIVEN IN THE ED  sodium chloride 0.9% BOLUS 1,000 mL (0 mLs Intravenous Stopped 9/6/24 0046)   HYDROmorphone (DILAUDID) injection 1 mg (1 mg Intravenous $Given 9/5/24 2225)   ondansetron (ZOFRAN) injection 4 mg (4 mg Intravenous $Given 9/5/24 2225)   iopamidol (ISOVUE-370) solution 75 mL (75 mLs Intravenous $Given 9/5/24 2321)   HYDROmorphone (DILAUDID) injection 0.5 mg (0.5 mg Intravenous $Given 9/6/24 0016)       NEW PRESCRIPTIONS STARTED AT TODAY'S VISIT  New Prescriptions    No medications on file          =================================================================    HPI:    Patient information was obtained from: patient     Use of : N/A      Bina Miller is a 58 year old female who presents with abdominal pain.    Patient stated that she has had abdominal pain since 6:00 PM yesterday. She described her pain as constant, in the middle of her abdomen, radiating to her left flank, and progressively getting worse. She has had nausea with one episode of dry heaving, but said no actual vomiting occurred. She has also felt week and said that she has been constantly burping. She mentioned that when she burps, she gets a sharp, stabbing pain in her left side. Patient said that she had loose stool last night. Patient denied any sick contacts, recent travel, kidney infection, history of same symptoms, and and did not mention any other symptoms.       RELEVANT HISTORY, MEDICATIONS, & ALLERGIES   Past medical history, surgical history, family history, medications, and allergies reviewed and pertinent noted in HPI.    REVIEW OF SYSTEMS:  A complete review of systems was performed with pertinent positives and  "negatives noted in the HPI. All other systems negative.     PHYSICAL EXAM:    Vitals: BP (!) 156/79   Pulse 89   Temp 97.2  F (36.2  C)   Resp 18   Ht 1.727 m (5' 8\")   Wt (!) 204.1 kg (450 lb)   SpO2 97%   BMI 68.42 kg/m     General: Alert and interactive, comfortable appearing.  HENT: Atraumatic. Full AROM of neck. MMM.  Cardiovascular: Regular rate and rhythm.   Chest/Pulmonary: Normal work of breathing. Speaking in complete sentences. Lungs CTAB. No chest wall tenderness or deformities.  Abdomen: Soft, obese. Vague TTP LLQ and L flank.   Extremities: Normal AROM of all major joints.  Skin: Warm and dry. Normal skin color.   Neuro: Speech clear. CNs grossly intact. Moves all extremities spontaneously.   Psych: Normal affect/mood, cooperative, memory appropriate.      LAB  Labs Ordered and Resulted from Time of ED Arrival to Time of ED Departure   COMPREHENSIVE METABOLIC PANEL - Abnormal       Result Value    Sodium 136      Potassium 4.6      Carbon Dioxide (CO2) 23      Anion Gap 14      Urea Nitrogen 20.1      Creatinine 1.27 (*)     GFR Estimate 48 (*)     Calcium 9.6      Chloride 99      Glucose 121 (*)     Alkaline Phosphatase 77      AST 22      ALT 18      Protein Total 7.8      Albumin 4.4      Bilirubin Total 1.0     CBC WITH PLATELETS - Abnormal    WBC Count 11.9 (*)     RBC Count 4.91      Hemoglobin 13.9      Hematocrit 42.4      MCV 86      MCH 28.3      MCHC 32.8      RDW 17.5 (*)     Platelet Count 212     ROUTINE UA WITH MICROSCOPIC REFLEX TO CULTURE - Abnormal    Color Urine Light Yellow      Appearance Urine Clear      Glucose Urine Negative      Bilirubin Urine Negative      Ketones Urine 20 (*)     Specific Gravity Urine 1.019      Blood Urine 0.06 mg/dL (*)     pH Urine 7.5 (*)     Protein Albumin Urine Negative      Urobilinogen Urine <2.0      Nitrite Urine Negative      Leukocyte Esterase Urine Negative      Bacteria Urine Few (*)     Mucus Urine Present (*)     RBC Urine 3 (*)     " WBC Urine <1      Squamous Epithelials Urine 1     LIPASE - Normal    Lipase 24     LACTIC ACID WHOLE BLOOD WITH 1X REPEAT IN 2 HR WHEN >2 - Normal    Lactic Acid, Initial 1.5     COMPREHENSIVE METABOLIC PANEL   PROCALCITONIN   CRP INFLAMMATION   URINE CULTURE       RADIOLOGY  CT Abdomen Pelvis w Contrast   Final Result   IMPRESSION:    1.  Obstructive 5 x 2 x 4 mm left proximal ureteral calculi, just distal to the ureteropelvic junction, with proximal hydroureteronephrosis. Additionally there is perinephric and periureteral stranding which is likely due to inflammation secondary to the    obstructed ureter although superimposed urinary tract is and/or pyelonephritis cannot be completely excluded. Correlation with urinalysis and symptomatology is recommended.   2.  Scattered colonic diverticulosis without evidence of diverticulitis          I, Jaspal Monaco, am serving as a scribe to document services personally performed by Dr. Jennifer Grigsby based on my observation and the provider's statements to me. I, Jennifer Grigsby MD attest that Jaspal Monaco is acting in a scribe capacity, has observed my performance of the services and has documented them in accordance with my direction.    Jennifer Grigsby M.D.  Emergency Medicine  [facility] EMERGENCY DEPARTMENT  [facility] [facility] [address] [phone]  Dept: [phone]      Jennifer Grigsby MD  09/06/24 0117

## 2024-09-06 NOTE — ANESTHESIA PREPROCEDURE EVALUATION
Anesthesia Pre-Procedure Evaluation    Patient: Bina Miller   MRN: [id number] : 1965        Procedure : Procedure(s):  CYSTOSCOPY, WITH LEFT RETROGRADE PYELOGRAM AND LEFT URETERAL STENT REPLACEMENT          History reviewed. No pertinent past medical history.   Past Surgical History:   Procedure Laterality Date     LAPAROSCOPIC HYSTERECTOMY SUPRACERVICAL       TONSILLECTOMY      as child      WISDOM TOOTH EXTRACTION        Allergies   Allergen Reactions     Ace Inhibitors Cough     Ibuprofen Hives     Keflex [Cephalexin] Other (See Comments) and Headache     Light sensitivity     Novocain [Procaine] Hives     Penicillins Other (See Comments)     Patient does not take as she has a strong family history anaphylaxis in the family.      Social History     Tobacco Use     Smoking status: Never     Smokeless tobacco: Never   Substance Use Topics     Alcohol use: No      Wt Readings from Last 1 Encounters:   24 (!) 204.1 kg (450 lb)        Anesthesia Evaluation   Pt has had prior anesthetic.     History of anesthetic complications  - .      ROS/MED HX  ENT/Pulmonary:     (+) sleep apnea,    SERA risk factors,  hypertension, obese,                                Neurologic:  - neg neurologic ROS     Cardiovascular:     (+)  hypertension- -   -  - -                        dysrhythmias, a-fib,             METS/Exercise Tolerance:     Hematologic:  - neg hematologic  ROS     Musculoskeletal:   (+)  arthritis,             GI/Hepatic:  - neg GI/hepatic ROS     Renal/Genitourinary:  - neg Renal ROS     Endo:  - neg endo ROS   (+)               Obesity (SMO),       Psychiatric/Substance Use:  - neg psychiatric ROS     Infectious Disease:  - neg infectious disease ROS     Malignancy:  - neg malignancy ROS     Other:  - neg other ROS          Physical Exam    Airway  airway exam normal      Mallampati: II   TM distance: > 3 FB   Neck ROM: full   Mouth opening: > 3 cm    Respiratory Devices and Support         Dental   "no notable dental history     (+) Modest Abnormalities - crowns, retainers, 1 or 2 missing teeth      Cardiovascular   cardiovascular exam normal       Rhythm and rate: regular and normal     Pulmonary   pulmonary exam normal        breath sounds clear to auscultation       OUTSIDE LABS:  CBC:   Lab Results   Component Value Date    WBC 10.7 09/06/2024    WBC 11.9 (H) 09/05/2024    HGB 12.9 09/06/2024    HGB 13.9 09/05/2024    HCT 40.3 09/06/2024    HCT 42.4 09/05/2024     09/06/2024     09/05/2024     BMP:   Lab Results   Component Value Date     09/06/2024     09/05/2024    POTASSIUM 4.6 09/06/2024    POTASSIUM 4.6 09/05/2024    CHLORIDE 100 09/06/2024    CHLORIDE 99 09/05/2024    CO2 25 09/06/2024    CO2 23 09/05/2024    BUN 19.4 09/06/2024    BUN 20.1 09/05/2024    CR 1.41 (H) 09/06/2024    CR 1.27 (H) 09/05/2024     (H) 09/06/2024     (H) 09/06/2024     COAGS:   Lab Results   Component Value Date    INR 2.73 (H) 09/06/2024     POC: No results found for: \"BGM\", \"HCG\", \"HCGS\"  HEPATIC:   Lab Results   Component Value Date    ALBUMIN 4.1 09/06/2024    PROTTOTAL 7.2 09/06/2024    ALT 17 09/06/2024    AST 17 09/06/2024    ALKPHOS 69 09/06/2024    BILITOTAL 0.9 09/06/2024     OTHER:   Lab Results   Component Value Date    LACT 1.5 09/05/2024    RAMÓN 9.2 09/06/2024    MAG 2.1 09/06/2024    LIPASE 24 09/05/2024       Anesthesia Plan    ASA Status:  4    NPO Status:  NPO Appropriate    Anesthesia Type: MAC.     - Reason for MAC: straight local not clinically adequate              Consents    Anesthesia Plan(s) and associated risks, benefits, and realistic alternatives discussed. Questions answered and patient/representative(s) expressed understanding.     - Discussed:     - Discussed with:  Patient       - Patient is DNR/DNI Status: No          Postoperative Care    Pain management: Multi-modal analgesia.   PONV prophylaxis: Ondansetron (or other 5HT-3)     Comments:    Other " "Comments: BRITTANY Gentile MD    I have reviewed the pertinent notes and labs in the chart from the past 30 days and (re)examined the patient.  Any updates or changes from those notes are reflected in this note.            # Drug Induced Coagulation Defect: home medication list includes an anticoagulant medication   # Severe Obesity: Estimated body mass index is 68.42 kg/m  as calculated from the following:    Height as of this encounter: 1.727 m (5' 8\").    Weight as of this encounter: 204.1 kg (450 lb).      "

## 2024-09-07 LAB
ANION GAP SERPL CALCULATED.3IONS-SCNC: 11 MMOL/L (ref 7–15)
BACTERIA UR CULT: NORMAL
BUN SERPL-MCNC: 14.2 MG/DL (ref 8–23)
CALCIUM SERPL-MCNC: 9.3 MG/DL (ref 8.8–10.4)
CHLORIDE SERPL-SCNC: 102 MMOL/L (ref 98–107)
CREAT SERPL-MCNC: 0.84 MG/DL (ref 0.51–0.95)
EGFRCR SERPLBLD CKD-EPI 2021: 79 ML/MIN/1.73M2
ERYTHROCYTE [DISTWIDTH] IN BLOOD BY AUTOMATED COUNT: 17.2 % (ref 10–15)
GLUCOSE BLDC GLUCOMTR-MCNC: 102 MG/DL (ref 70–99)
GLUCOSE SERPL-MCNC: 108 MG/DL (ref 70–99)
HCO3 SERPL-SCNC: 25 MMOL/L (ref 22–29)
HCT VFR BLD AUTO: 39.4 % (ref 35–47)
HGB BLD-MCNC: 12.5 G/DL (ref 11.7–15.7)
INR PPP: 3.06 (ref 0.85–1.15)
MAGNESIUM SERPL-MCNC: 2.1 MG/DL (ref 1.7–2.3)
MCH RBC QN AUTO: 28.7 PG (ref 26.5–33)
MCHC RBC AUTO-ENTMCNC: 31.7 G/DL (ref 31.5–36.5)
MCV RBC AUTO: 90 FL (ref 78–100)
PLATELET # BLD AUTO: 197 10E3/UL (ref 150–450)
POTASSIUM SERPL-SCNC: 4.9 MMOL/L (ref 3.4–5.3)
RBC # BLD AUTO: 4.36 10E6/UL (ref 3.8–5.2)
SODIUM SERPL-SCNC: 138 MMOL/L (ref 135–145)
WBC # BLD AUTO: 8.4 10E3/UL (ref 4–11)

## 2024-09-07 PROCEDURE — 36415 COLL VENOUS BLD VENIPUNCTURE: CPT | Performed by: STUDENT IN AN ORGANIZED HEALTH CARE EDUCATION/TRAINING PROGRAM

## 2024-09-07 PROCEDURE — 80048 BASIC METABOLIC PNL TOTAL CA: CPT | Performed by: STUDENT IN AN ORGANIZED HEALTH CARE EDUCATION/TRAINING PROGRAM

## 2024-09-07 PROCEDURE — 250N000013 HC RX MED GY IP 250 OP 250 PS 637: Performed by: STUDENT IN AN ORGANIZED HEALTH CARE EDUCATION/TRAINING PROGRAM

## 2024-09-07 PROCEDURE — 82962 GLUCOSE BLOOD TEST: CPT

## 2024-09-07 PROCEDURE — 250N000013 HC RX MED GY IP 250 OP 250 PS 637: Performed by: INTERNAL MEDICINE

## 2024-09-07 PROCEDURE — 96361 HYDRATE IV INFUSION ADD-ON: CPT

## 2024-09-07 PROCEDURE — 85027 COMPLETE CBC AUTOMATED: CPT | Performed by: STUDENT IN AN ORGANIZED HEALTH CARE EDUCATION/TRAINING PROGRAM

## 2024-09-07 PROCEDURE — 85610 PROTHROMBIN TIME: CPT | Performed by: INTERNAL MEDICINE

## 2024-09-07 PROCEDURE — 250N000011 HC RX IP 250 OP 636: Performed by: INTERNAL MEDICINE

## 2024-09-07 PROCEDURE — 250N000011 HC RX IP 250 OP 636: Performed by: STUDENT IN AN ORGANIZED HEALTH CARE EDUCATION/TRAINING PROGRAM

## 2024-09-07 PROCEDURE — G0378 HOSPITAL OBSERVATION PER HR: HCPCS

## 2024-09-07 PROCEDURE — 99232 SBSQ HOSP IP/OBS MODERATE 35: CPT | Performed by: INTERNAL MEDICINE

## 2024-09-07 PROCEDURE — 83735 ASSAY OF MAGNESIUM: CPT | Performed by: STUDENT IN AN ORGANIZED HEALTH CARE EDUCATION/TRAINING PROGRAM

## 2024-09-07 RX ORDER — HYDROMORPHONE HYDROCHLORIDE 2 MG/1
2 TABLET ORAL EVERY 4 HOURS PRN
Status: DISCONTINUED | OUTPATIENT
Start: 2024-09-07 | End: 2024-09-09 | Stop reason: HOSPADM

## 2024-09-07 RX ORDER — HYDROMORPHONE HYDROCHLORIDE 1 MG/ML
0.4 INJECTION, SOLUTION INTRAMUSCULAR; INTRAVENOUS; SUBCUTANEOUS EVERY 8 HOURS PRN
Status: DISCONTINUED | OUTPATIENT
Start: 2024-09-07 | End: 2024-09-09 | Stop reason: HOSPADM

## 2024-09-07 RX ADMIN — CEFTRIAXONE SODIUM 2 G: 2 INJECTION, POWDER, FOR SOLUTION INTRAMUSCULAR; INTRAVENOUS at 05:23

## 2024-09-07 RX ADMIN — ACETAMINOPHEN 650 MG: 325 TABLET ORAL at 15:32

## 2024-09-07 RX ADMIN — HYDROMORPHONE HYDROCHLORIDE 2 MG: 2 TABLET ORAL at 15:31

## 2024-09-07 RX ADMIN — ALLOPURINOL 300 MG: 300 TABLET ORAL at 08:25

## 2024-09-07 RX ADMIN — FAMOTIDINE 20 MG: 10 INJECTION, SOLUTION INTRAVENOUS at 00:20

## 2024-09-07 RX ADMIN — TAMSULOSIN HYDROCHLORIDE 0.4 MG: 0.4 CAPSULE ORAL at 08:26

## 2024-09-07 RX ADMIN — CYCLOSPORINE 1 DROP: 0.5 EMULSION OPHTHALMIC at 08:26

## 2024-09-07 RX ADMIN — VERAPAMIL HYDROCHLORIDE 180 MG: 180 TABLET, FILM COATED, EXTENDED RELEASE ORAL at 21:58

## 2024-09-07 RX ADMIN — FAMOTIDINE 20 MG: 10 INJECTION, SOLUTION INTRAVENOUS at 11:02

## 2024-09-07 RX ADMIN — DULOXETINE HYDROCHLORIDE 40 MG: 20 CAPSULE, DELAYED RELEASE ORAL at 08:26

## 2024-09-07 RX ADMIN — HYDROXYCHLOROQUINE SULFATE 200 MG: 200 TABLET, FILM COATED ORAL at 20:39

## 2024-09-07 RX ADMIN — HYDROMORPHONE HYDROCHLORIDE 0.4 MG: 0.2 INJECTION, SOLUTION INTRAMUSCULAR; INTRAVENOUS; SUBCUTANEOUS at 06:05

## 2024-09-07 RX ADMIN — ACETAMINOPHEN 650 MG: 325 TABLET ORAL at 10:53

## 2024-09-07 RX ADMIN — CYCLOSPORINE 1 DROP: 0.5 EMULSION OPHTHALMIC at 20:38

## 2024-09-07 RX ADMIN — HYDROMORPHONE HYDROCHLORIDE 0.4 MG: 0.2 INJECTION, SOLUTION INTRAMUSCULAR; INTRAVENOUS; SUBCUTANEOUS at 10:53

## 2024-09-07 RX ADMIN — HYDROMORPHONE HYDROCHLORIDE 2 MG: 2 TABLET ORAL at 21:58

## 2024-09-07 RX ADMIN — HYDROXYCHLOROQUINE SULFATE 200 MG: 200 TABLET, FILM COATED ORAL at 08:25

## 2024-09-07 RX ADMIN — FEXOFENADINE HYDROCHLORIDE 180 MG: 60 TABLET ORAL at 08:25

## 2024-09-07 ASSESSMENT — ACTIVITIES OF DAILY LIVING (ADL)
ADLS_ACUITY_SCORE: 40
ADLS_ACUITY_SCORE: 42
ADLS_ACUITY_SCORE: 43
ADLS_ACUITY_SCORE: 43
ADLS_ACUITY_SCORE: 42
ADLS_ACUITY_SCORE: 40
ADLS_ACUITY_SCORE: 43
ADLS_ACUITY_SCORE: 42
ADLS_ACUITY_SCORE: 42
ADLS_ACUITY_SCORE: 43
ADLS_ACUITY_SCORE: 42

## 2024-09-07 NOTE — PLAN OF CARE
"  Problem: Pain Acute  Goal: Optimal Pain Control and Function  Outcome: Progressing     Problem: Adult Inpatient Plan of Care  Goal: Optimal Comfort and Wellbeing  Outcome: Progressing   Goal Outcome Evaluation:    Patient is alert and oriented and able to make needs known.  Patient repositions independently. Patient has pure wick but bed was wet around 6:00 am.   Denied pain throughout the night until movement with bedding change.  Dilaudid given per MAR.  Ordered InterDry from storeroom to use under breasts.  VSS on 1.5 L NC.  Diet advanced to regular.  Continue to monitor.    /69 (BP Location: Right arm)   Pulse 78   Temp 98  F (36.7  C) (Oral)   Resp 18   Ht 1.727 m (5' 8\")   Wt (!) 204.1 kg (450 lb)   SpO2 96%   BMI 68.42 kg/m      April N MARY Lovell    "

## 2024-09-07 NOTE — PROGRESS NOTES
[facility]    Medicine Progress Note - Hospitalist Service    Date of Admission:  9/5/2024    Assessment & Plan   59 yo Female with history of Lupus, connective tissue disease, rheumatoid arthritis, chronic low back pain, celiac disease, polyarthritis, HTN, chronic A-fib, and morbid obesity admitted on 9/5/2024 with intractable left flank pain and left lower quadrant abdominal pain likely secondary to left ureteral calculi and suspected left pyelonephritis.    CT showed obstructive 5 x 2 x 4 mm left proximal ureteral calculus, just distal to ureteropelvic junction with proximal hydroureteronephrosis.  She was placed on IV ceftriaxone on admission. Urine culture grew mixed urogenital jose.  She underwent left ureteral stent placement on 9/6/2024 for left ureteral calculus.  Urologist recommends continuation of antibiotics for 1 week and holding warfarin in anticipation for left ureteroscopy, tentatively in the next 1 week as outpatient.      Obstructive uropathy  -CT shows obstructive 5 x 2 x 4 mm left proximal ureteral calculi, just distal to ureteropelvic junction with proximal hydroureteronephrosis  -Underwent left ureteral stent placement on 9/6/2024  -Analgesics as needed  -On Tamsulosin 0.4mg  -Continue tamsulosin  -Continue to hold warfarin for now for planned ureteroscopy next week  -Urology [id number] assistance     SAVAGE on CKD, uptrending  Likely due to obstruction, as above  Monitor I/o's  Monitor Cr    Possible UTI/Pyelonephritis  Presented with LLQ and left flank pain  Increased urinary frequency  UA not impressive for UTI.  Urine culture grew mixed urogenital jose.  CRP elevated, procalcitonin negative  WBC downtrended  Imaging perinephric and periureteral stranding likely due to inflammation secondary to obstructed ureter versus superimposed UTI/pyelonephritis  Continue IV ceftriaxone 2 g daily-started on 9/6/2024; with plan to transition to cefdinir to complete 1  "week of antibiotic therapy as recommended by urologist      History of gout  continue allopurinol    Hypertension  Hold losartan  Continue verapamil  hydralazine as needed     Chronic low back pain  On Duloxetine, Naltrexone at home - not available here  Prn Dilaudid    Rheumatoid arthritis  Lupus  Plaquenil    Morbid obesity     Obstructive sleep apnea  CPAP     Perforating folliculitis  noted to have erythematous papillomata's rash on bilateral arms    Chronic A.fib  INR 2.73  Coumadin held - for stent placement  PTA Verapamil (did not receive dose last night)    Morbid obesity  Deconditioning  PT/OT        Diet: Regular Diet Adult    DVT Prophylaxis: Warfarin  Shea Catheter: Not present  Lines: None     Cardiac Monitoring: ACTIVE order. Indication: Tachyarrhythmias, acute (48 hours)  Code Status: Full Code      Clinically Significant Risk Factors Present on Admission               # Drug Induced Coagulation Defect: home medication list includes an anticoagulant medication    # Hypertension: Noted on problem list         # Severe Obesity: Estimated body mass index is 68.42 kg/m  as calculated from the following:    Height as of this encounter: 1.727 m (5' 8\").    Weight as of this encounter: 204.1 kg (450 lb).             Disposition Plan     Medically Ready for Discharge: Anticipated Tomorrow      Trini Pinedo MD  Hospitalist Service  [facility]  Securely message with [employer] (more info)  Text page via [employer] Paging/Directory   ______________________________________________________________________    Interval History   No new complaints today and no acute events overnight.  Pain and nausea have improved. She states she is feeling better.    Physical Exam   Vital Signs: Temp: 98.3  F (36.8  C) Temp src: Oral BP: 133/56 Pulse: 105   Resp: 20 SpO2: 97 % O2 Device: Nasal cannula Oxygen Delivery: 1.5 LPM  Weight: 450 lbs 0 oz    General AOx3, mod distress due to pain, Morbidly " obese  Chest Distant, No wheezing  Heart RRR, No M/R/G  Abd- Soft, NT, BS+, LLQ tenderness   Extremity- Moving all extremities, No digital clubbing, No edema  Neuro- Aox3, moving all extremities  Skin: erythematous rash on bilateral arms    Medical Decision Making       50 MINUTES SPENT BY ME on the date of service doing chart review, history, exam, documentation & further activities per the note.      Data     I have personally reviewed the following data over the past 24 hrs:    8.4  \   12.5   / 197     138 102 14.2 /  102 (H)   4.9 25 0.84 \     INR:  3.06 (H) PTT:  N/A   D-dimer:  N/A Fibrinogen:  N/A       Imaging results reviewed over the past 24 hrs:   Recent Results (from the past 24 hour(s))   XR Surgery PIERRE L/T 5 Min Fluoro    Narrative    This exam was marked as non-reportable because it will not be read by a   radiologist or a [place] non-radiologist provider.

## 2024-09-07 NOTE — PROGRESS NOTES
[facility]   POST-OPERATIVE Progress Note    Place of Service: [facility]    Surgery: POD # 1 status post left ureteral stent placement    Subjective: Patient reports she feels much better.  Does still have flank pain but it is less intense and intermittent now.  Patient denies nausea, vomiting, fever, chills, chest pain or shortness of breath.   Activity: pt is ambulating   Diet: tolerating a diet without nausea or vomiting      Objective  Intake/Output last 24 hrs:    Intake/Output Summary (Last 24 hours) at 9/7/2024 0937  Last data filed at 9/7/2024 0821  Gross per 24 hour   Intake 2105 ml   Output 1400 ml   Net 705 ml       Physical Exam:  Temp:  [98  F (36.7  C)-98.5  F (36.9  C)] 98.3  F (36.8  C)  Pulse:  [] 105  Resp:  [18-20] 20  BP: (109-150)/(56-73) 133/56  SpO2:  [93 %-98 %] 97 %  General: NAD, alert, cooperative  Abdomen: soft, non-tender, non-distended. No suprapubic fullness or tenderness.   Genitourinary: Voiding    Pertinent Labs     Creatinine 0.84 WBC 8.4    Assessment/Plan  Bina Miller is POD # 1 status post left ureteral stent placement for an obstructing 5 mm left proximal ureteral calculus.  She is much improved.  No compelling evidence for infection but there certainly was evidence for significant inflammation in the perinephric space on the CT scan.  I would recommend at least 1 week of antibiotics in preparation for left ureteroscopy.  We will arrange that as an outpatient.  If she remains stable, she could be discharged later from the urology perspective.  I would hold the warfarin for anticipated surgery in about 1 week.    Fernando Birceno MD  [facility]  Phone #[phone]

## 2024-09-07 NOTE — CARE PLAN
PRIMARY DIAGNOSIS: ACUTE PAIN  OUTPATIENT/OBSERVATION GOALS TO BE MET BEFORE DISCHARGE:  1. Pain Status: Improved but still requiring IV narcotics.    2. Return to near baseline physical activity: No    3. Cleared for discharge by consultants (if involved): No    Discharge Planner Nurse   Safe discharge environment identified: Yes  Barriers to discharge: No       Entered by: Ekaterina Lovell RN 09/07/2024 7:17 AM     Please review provider order for any additional goals.   Nurse to notify provider when observation goals have been met and patient is ready for discharge.

## 2024-09-07 NOTE — PLAN OF CARE
PRIMARY DIAGNOSIS: ACUTE PAIN  OUTPATIENT/OBSERVATION GOALS TO BE MET BEFORE DISCHARGE:  1. Pain Status: Improved but still requiring IV narcotics.    2. Return to near baseline physical activity: No    3. Cleared for discharge by consultants (if involved): No    Discharge Planner Nurse   Safe discharge environment identified: No  Barriers to discharge: Yes       Entered by: Ekaterina Lovell RN 09/07/2024 7:16 AM     Please review provider order for any additional goals.   Nurse to notify provider when observation goals have been met and patient is ready for discharge.Goal Outcome Evaluation:

## 2024-09-07 NOTE — PLAN OF CARE
Problem: Pain Acute  Goal: Optimal Pain Control and Function  Outcome: Progressing   Goal Outcome Evaluation:    Pt denies pain.  She can turn from side to side in a Inocente bed.  Stated that she had been sleeping in the recliner at home for the last 20 years.  Pt was worried about sleeping in bed but it's been going well so far.  Pure wick applied.  Urine is cherry color.  Took meds with sips of water.      At 22:40 pt c/o headaches and requested for Tylenol.  Tylenol 650 mg given x 1.

## 2024-09-07 NOTE — PROGRESS NOTES
"PRIMARY DIAGNOSIS: \"GENERIC\" NURSING  OUTPATIENT/OBSERVATION GOALS TO BE MET BEFORE DISCHARGE:  ADLs back to baseline: Yes    Activity and level of assistance: Up with standby assistance.    Pain status: Improved-controlled with oral pain medications.    Return to near baseline physical activity: No     Discharge Planner Nurse   Safe discharge environment identified: Yes  Barriers to discharge:  Need PT and OT recommendations       Entered by: Charleen Breen RN 09/07/2024 5:30 PM     Please review provider order for any additional goals.   Nurse to notify provider when observation goals have been met and patient is ready for discharge.  "

## 2024-09-07 NOTE — PROGRESS NOTES
"PRIMARY DIAGNOSIS: \"GENERIC\" NURSING  OUTPATIENT/OBSERVATION GOALS TO BE MET BEFORE DISCHARGE:  ADLs back to baseline: yes    Activity and level of assistance: Up with standby assistance.    Pain status: need adjustment in oral pain medications    Return to near baseline physical activity: No     Discharge Planner Nurse   Safe discharge environment identified:  Need PT and OT recommendations  Barriers to discharge:  Need PT and OT recommendations        Entered by: Charleen Breen RN 09/07/2024 2:15 PM     Please review provider order for any additional goals.   Nurse to notify provider when observation goals have been met and patient is ready for discharge.  "

## 2024-09-08 LAB
INR PPP: 2.69 (ref 0.85–1.15)
MAGNESIUM SERPL-MCNC: 2 MG/DL (ref 1.7–2.3)
POTASSIUM SERPL-SCNC: 4 MMOL/L (ref 3.4–5.3)

## 2024-09-08 PROCEDURE — 250N000013 HC RX MED GY IP 250 OP 250 PS 637: Performed by: INTERNAL MEDICINE

## 2024-09-08 PROCEDURE — 83735 ASSAY OF MAGNESIUM: CPT | Performed by: INTERNAL MEDICINE

## 2024-09-08 PROCEDURE — 999N000111 HC STATISTIC OT IP EVAL DEFER

## 2024-09-08 PROCEDURE — 99232 SBSQ HOSP IP/OBS MODERATE 35: CPT | Performed by: INTERNAL MEDICINE

## 2024-09-08 PROCEDURE — 258N000003 HC RX IP 258 OP 636: Performed by: STUDENT IN AN ORGANIZED HEALTH CARE EDUCATION/TRAINING PROGRAM

## 2024-09-08 PROCEDURE — 250N000013 HC RX MED GY IP 250 OP 250 PS 637: Performed by: STUDENT IN AN ORGANIZED HEALTH CARE EDUCATION/TRAINING PROGRAM

## 2024-09-08 PROCEDURE — 250N000011 HC RX IP 250 OP 636: Performed by: INTERNAL MEDICINE

## 2024-09-08 PROCEDURE — G0378 HOSPITAL OBSERVATION PER HR: HCPCS

## 2024-09-08 PROCEDURE — 84132 ASSAY OF SERUM POTASSIUM: CPT | Performed by: INTERNAL MEDICINE

## 2024-09-08 PROCEDURE — 36415 COLL VENOUS BLD VENIPUNCTURE: CPT | Performed by: STUDENT IN AN ORGANIZED HEALTH CARE EDUCATION/TRAINING PROGRAM

## 2024-09-08 PROCEDURE — 96361 HYDRATE IV INFUSION ADD-ON: CPT

## 2024-09-08 PROCEDURE — 85610 PROTHROMBIN TIME: CPT | Performed by: STUDENT IN AN ORGANIZED HEALTH CARE EDUCATION/TRAINING PROGRAM

## 2024-09-08 RX ADMIN — DULOXETINE HYDROCHLORIDE 40 MG: 20 CAPSULE, DELAYED RELEASE ORAL at 08:18

## 2024-09-08 RX ADMIN — HYDROXYCHLOROQUINE SULFATE 200 MG: 200 TABLET, FILM COATED ORAL at 08:18

## 2024-09-08 RX ADMIN — HYDROMORPHONE HYDROCHLORIDE 2 MG: 2 TABLET ORAL at 21:40

## 2024-09-08 RX ADMIN — FAMOTIDINE 20 MG: 10 INJECTION, SOLUTION INTRAVENOUS at 12:43

## 2024-09-08 RX ADMIN — CYCLOSPORINE 1 DROP: 0.5 EMULSION OPHTHALMIC at 21:01

## 2024-09-08 RX ADMIN — VERAPAMIL HYDROCHLORIDE 180 MG: 180 TABLET, FILM COATED, EXTENDED RELEASE ORAL at 21:02

## 2024-09-08 RX ADMIN — SODIUM CHLORIDE, POTASSIUM CHLORIDE, SODIUM LACTATE AND CALCIUM CHLORIDE: 600; 310; 30; 20 INJECTION, SOLUTION INTRAVENOUS at 09:35

## 2024-09-08 RX ADMIN — FAMOTIDINE 20 MG: 10 INJECTION, SOLUTION INTRAVENOUS at 00:09

## 2024-09-08 RX ADMIN — CEFTRIAXONE SODIUM 2 G: 2 INJECTION, POWDER, FOR SOLUTION INTRAMUSCULAR; INTRAVENOUS at 04:18

## 2024-09-08 RX ADMIN — FEXOFENADINE HYDROCHLORIDE 180 MG: 60 TABLET ORAL at 08:19

## 2024-09-08 RX ADMIN — CYCLOSPORINE 1 DROP: 0.5 EMULSION OPHTHALMIC at 08:20

## 2024-09-08 RX ADMIN — ALLOPURINOL 300 MG: 300 TABLET ORAL at 08:18

## 2024-09-08 RX ADMIN — HYDROXYCHLOROQUINE SULFATE 200 MG: 200 TABLET, FILM COATED ORAL at 21:02

## 2024-09-08 RX ADMIN — ACETAMINOPHEN 650 MG: 325 TABLET ORAL at 04:17

## 2024-09-08 RX ADMIN — TAMSULOSIN HYDROCHLORIDE 0.4 MG: 0.4 CAPSULE ORAL at 08:18

## 2024-09-08 ASSESSMENT — ACTIVITIES OF DAILY LIVING (ADL)
ADLS_ACUITY_SCORE: 40
ADLS_ACUITY_SCORE: 42
ADLS_ACUITY_SCORE: 40
ADLS_ACUITY_SCORE: 42
ADLS_ACUITY_SCORE: 40
ADLS_ACUITY_SCORE: 40
ADLS_ACUITY_SCORE: 42
ADLS_ACUITY_SCORE: 42
ADLS_ACUITY_SCORE: 40
ADLS_ACUITY_SCORE: 42

## 2024-09-08 NOTE — PROGRESS NOTES
Physical Therapy: Orders received. Chart reviewed and discussed with care team.? Physical Therapy not indicated due to patient independent with functional mobility.? Defer discharge recommendations to OT.? Will complete orders.     Deysi Brooks, PT  9/8/2024

## 2024-09-08 NOTE — PROGRESS NOTES
[facility]    Medicine Progress Note - Hospitalist Service    Date of Admission:  9/5/2024    Assessment & Plan   59 yo Female with history of Lupus, connective tissue disease, rheumatoid arthritis, chronic low back pain, celiac disease, polyarthritis, HTN, chronic A-fib, and morbid obesity admitted on 9/5/2024 with intractable left flank pain and left lower quadrant abdominal pain likely secondary to left ureteral calculi and suspected left pyelonephritis.    CT showed obstructive 5 x 2 x 4 mm left proximal ureteral calculus, just distal to ureteropelvic junction with proximal hydroureteronephrosis.  She was placed on IV ceftriaxone on admission. Urine culture grew mixed urogenital jose.  She underwent left ureteral stent placement on 9/6/2024 for left ureteral calculus.  Urologist recommends continuation of antibiotics for 1 week and holding warfarin in anticipation for left ureteroscopy, tentatively in the next 1 week as outpatient.      Obstructive uropathy  -CT shows obstructive 5 x 2 x 4 mm left proximal ureteral calculi, just distal to ureteropelvic junction with proximal hydroureteronephrosis  -Underwent left ureteral stent placement on 9/6/2024  -Analgesics as needed  -On Tamsulosin 0.4mg  -Continue tamsulosin  -Continue to hold warfarin for now for planned ureteroscopy next week  -Urology [id number] assistance     SAVAGE on CKD, improving   Likely due to obstruction, as above  Monitor I/o's and Creat    Possible UTI/Pyelonephritis  Presented with LLQ and left flank pain  Increased urinary frequency  UA not impressive for UTI.  Urine culture grew mixed urogenital jose.  CRP elevated, procalcitonin negative  WBC downtrended  Imaging with  perinephric and periureteral stranding likely due to inflammation secondary to obstructed ureter versus superimposed UTI/pyelonephritis  Continue IV ceftriaxone 2 g daily-started on 9/6/2024; with plan to transition to cefdinir to complete  "1 week of antibiotic therapy as recommended by urologist    History of gout  continue allopurinol    Hypertension  Hold losartan  Continue verapamil  hydralazine as needed     Chronic low back pain  On Duloxetine, Naltrexone at home - not available here  Prn Dilaudid    Rheumatoid arthritis  Lupus  Plaquenil    Morbid obesity     Obstructive sleep apnea  CPAP     Perforating folliculitis  noted to have erythematous papillomata's rash on bilateral arms    Chronic A.fib  INR 2.73  Coumadin held - for stent placement  PTA Verapamil (did not receive dose last night)    Morbid obesity  Deconditioning  PT/OT    Dispo- work on pain control today and discharge in am     Diet: Regular Diet Adult    DVT Prophylaxis: Warfarin  Shea Catheter: Not present  Lines: None     Cardiac Monitoring: ACTIVE order. Indication: Tachyarrhythmias, acute (48 hours)  Code Status: Full Code      Clinically Significant Risk Factors Present on Admission               # Drug Induced Coagulation Defect: home medication list includes an anticoagulant medication    # Hypertension: Noted on problem list         # Severe Obesity: Estimated body mass index is 65.65 kg/m  as calculated from the following:    Height as of this encounter: 1.727 m (5' 8\").    Weight as of this encounter: 195.9 kg (431 lb 12.8 oz).             Disposition Plan     Medically Ready for Discharge: Anticipated Tomorrow      Mike Castorena MD  Hospitalist Service  [facility]  Securely message with [employer] (more info)  Text page via [employer] Paging/Directory   ______________________________________________________________________    Interval History   Describes weakness, some L renal pain, not feeling ready for home today     Physical Exam   Vital Signs: Temp: 97.8  F (36.6  C) Temp src: Oral BP: 137/76 Pulse: 80   Resp: 18 SpO2: 95 % O2 Device: None (Room air)    Weight: 431 lbs 12.8 oz    General AOx3, mod distress due to pain, Morbidly obese  Chest clear "   Heart RRR, No M/R/G  Abd- Soft, NT, BS+, LLQ tenderness   Extremity- Moving all extremities, No edema   Skin: chronic erythematous rash on bilateral arms    Medical Decision Making       40 MINUTES SPENT BY ME on the date of service doing chart review, history, exam, documentation & further activities per the note.      Data     I have personally reviewed the following data over the past 24 hrs:    N/A  \   N/A   / N/A     N/A N/A N/A /  N/A   4.0 N/A N/A \     INR:  2.69 (H) PTT:  N/A   D-dimer:  N/A Fibrinogen:  N/A       Imaging results reviewed over the past 24 hrs:   No results found for this or any previous visit (from the past 24 hour(s)).

## 2024-09-08 NOTE — PLAN OF CARE
Problem: Adult Inpatient Plan of Care  Goal: Plan of Care Review  Outcome: Progressing  Flowsheets (Taken 9/8/2024 1151)  Plan of Care Reviewed With: patient  Overall Patient Progress: improving     Problem: Adult Inpatient Plan of Care  Goal: Absence of Hospital-Acquired Illness or Injury  Intervention: Identify and Manage Fall Risk  Recent Flowsheet Documentation  Taken 9/8/2024 1148 by Carly Hurtado RN  Safety Promotion/Fall Prevention:   activity supervised   assistive device/personal items within reach   clutter free environment maintained   lighting adjusted   mobility aid in reach   nonskid shoes/slippers when out of bed   patient and family education   room near nurse's station   safety round/check completed   supervised activity     Problem: Adult Inpatient Plan of Care  Goal: Absence of Hospital-Acquired Illness or Injury  Intervention: Prevent Skin Injury  Recent Flowsheet Documentation  Taken 9/8/2024 0900 by Carly Hurtado RN  Body Position: position changed independently     Problem: Adult Inpatient Plan of Care  Goal: Absence of Hospital-Acquired Illness or Injury  Intervention: Prevent and Manage VTE (Venous Thromboembolism) Risk  Recent Flowsheet Documentation  Taken 9/8/2024 1148 by Carly Hurtado RN  VTE Prevention/Management: SCDs on (sequential compression devices)     Problem: Adult Inpatient Plan of Care  Goal: Absence of Hospital-Acquired Illness or Injury  Intervention: Prevent Infection  Recent Flowsheet Documentation  Taken 9/8/2024 0900 by Carly Hurtado RN  Infection Prevention:   cohorting utilized   hand hygiene promoted     Problem: Adult Inpatient Plan of Care  Goal: Optimal Comfort and Wellbeing  Outcome: Progressing  Intervention: Monitor Pain and Promote Comfort  Recent Flowsheet Documentation  Taken 9/8/2024 0906 by Carly Hurtado RN  Pain Management Interventions:   emotional support   repositioned   rest  Taken 9/8/2024 0813 by Carly Hurtado RN  Pain Management Interventions:    declines   emotional support   rest     Problem: Pain Acute  Goal: Optimal Pain Control and Function  Outcome: Progressing  Intervention: Develop Pain Management Plan  Recent Flowsheet Documentation  Taken 9/8/2024 0906 by Carly Hurtado RN  Pain Management Interventions:   emotional support   repositioned   rest  Taken 9/8/2024 0813 by Carly Hurtado RN  Pain Management Interventions:   declines   emotional support   rest  Intervention: Prevent or Manage Pain  Recent Flowsheet Documentation  Taken 9/8/2024 1148 by Carly Hurtado RN  Medication Review/Management: medications reviewed     Problem: Fall Injury Risk  Goal: Absence of Fall and Fall-Related Injury  Intervention: Identify and Manage Contributors  Recent Flowsheet Documentation  Taken 9/8/2024 1148 by Carly Hurtado RN  Medication Review/Management: medications reviewed  Intervention: Promote Injury-Free Environment  Recent Flowsheet Documentation  Taken 9/8/2024 1148 by Carly Hurtado RN  Safety Promotion/Fall Prevention:   activity supervised   assistive device/personal items within reach   clutter free environment maintained   lighting adjusted   mobility aid in reach   nonskid shoes/slippers when out of bed   patient and family education   room near nurse's station   safety round/check completed   supervised activity     Goal Outcome Evaluation:      Plan of Care Reviewed With: patient    Overall Patient Progress: improvingOverall Patient Progress: improving    Outcome Evaluation: Alert and oriented x3. VSS. Ax1 walker and GB. Ambulates to bathroom. Makes needs known. Denies pain. Has urinary frequency, has purewick in place while in bed. Encouraged to turn and repositions, manages independently. PIV to LFA, LR @ 100 ml/hr. I/O qshift, VS q4hr, daily wt. K and Mg protocol.     Carly Hurtado RN

## 2024-09-08 NOTE — PROGRESS NOTES
"PRIMARY DIAGNOSIS: \"GENERIC\" NURSING  OUTPATIENT/OBSERVATION GOALS TO BE MET BEFORE DISCHARGE:  ADLs back to baseline: Yes    Activity and level of assistance: Up with standby assistance.    Pain status: Improved-controlled with oral pain medications.    Return to near baseline physical activity: No     Discharge Planner Nurse   Safe discharge environment identified: Yes  Barriers to discharge: Yes (PT and OT recommendation)       Entered by: Kari Tamez RN 09/07/2024 11:55 PM     Please review provider order for any additional goals.   Nurse to notify provider when observation goals have been met and patient is ready for discharge.    "

## 2024-09-08 NOTE — PLAN OF CARE
Goal Outcome Evaluation:      Plan of Care Reviewed With: patient    Overall Patient Progress: improving      Problem: Adult Inpatient Plan of Care  Goal: Absence of Hospital-Acquired Illness or Injury  Intervention: Identify and Manage Fall Risk  Recent Flowsheet Documentation  Taken 9/8/2024 0018 by Brandy Nicole RN  Safety Promotion/Fall Prevention:   treat underlying cause   treat reversible contributory factors   supervised activity   safety round/check completed   patient and family education   nonskid shoes/slippers when out of bed   clutter free environment maintained   increased rounding and observation     Problem: Adult Inpatient Plan of Care  Goal: Absence of Hospital-Acquired Illness or Injury  9/8/2024 0456 by Brandy Nicole RN  Outcome: Progressing  9/8/2024 0347 by Brandy Nicole RN  Outcome: Progressing  Intervention: Identify and Manage Fall Risk  Recent Flowsheet Documentation  Taken 9/8/2024 0018 by Brandy Nicole RN  Safety Promotion/Fall Prevention:   treat underlying cause   treat reversible contributory factors   supervised activity   safety round/check completed   patient and family education   nonskid shoes/slippers when out of bed   clutter free environment maintained   increased rounding and observation  Intervention: Prevent Skin Injury  Recent Flowsheet Documentation  Taken 9/8/2024 0017 by Brandy Nicole RN  Body Position: position changed independently     Problem: Fall Injury Risk  Goal: Absence of Fall and Fall-Related Injury  9/8/2024 0456 by Brandy Nicole RN  Outcome: Progressing  9/8/2024 0347 by Brandy Nicole RN  Outcome: Progressing  Intervention: Identify and Manage Contributors  Recent Flowsheet Documentation  Taken 9/8/2024 0018 by Brandy Nicole RN  Medication Review/Management: medications reviewed  Intervention: Promote Injury-Free Environment  Recent Flowsheet Documentation  Taken 9/8/2024 0018 by Brandy Nicole RN  Safety Promotion/Fall Prevention:   treat  underlying cause   treat reversible contributory factors   supervised activity   safety round/check completed   patient and family education   nonskid shoes/slippers when out of bed   clutter free environment maintained   increased rounding and observation     Problem: Pain Acute  Goal: Optimal Pain Control and Function  Intervention: Prevent or Manage Pain  Recent Flowsheet Documentation  Taken 9/8/2024 0018 by Brandy Nicole RN  Medication Review/Management: medications reviewed     Problem: Fall Injury Risk  Goal: Absence of Fall and Fall-Related Injury  Intervention: Promote Injury-Free Environment  Recent Flowsheet Documentation  Taken 9/8/2024 0018 by Brandy Nicole RN  Safety Promotion/Fall Prevention:   treat underlying cause   treat reversible contributory factors   supervised activity   safety round/check completed   patient and family education   nonskid shoes/slippers when out of bed   clutter free environment maintained   increased rounding and observation   Patient alert oriented x 4. Patient slept throughout the night. Tolerated cefTRIAXone well.   Pain associated with movement, otherwise, pain is manageable  with Tylenol.   Tele Afib   Brandy Nicole RN on 9/8/2024 at 6:08 AM          Brandy Nicole RN on 9/8/2024 at 5:01 AM           Detail Level: Detailed Quality 110: Preventive Care And Screening: Influenza Immunization: Influenza Immunization not Administered for Documented Reasons. Quality 226: Preventive Care And Screening: Tobacco Use: Screening And Cessation Intervention: Patient screened for tobacco use and is an ex/non-smoker Quality 431: Preventive Care And Screening: Unhealthy Alcohol Use - Screening: Patient screened for unhealthy alcohol use using a single question and scores less than 2 times per year

## 2024-09-08 NOTE — PLAN OF CARE
Occupational Therapy: Orders received. Chart reviewed and discussed with care team.? Occupational Therapy not indicated due to met with pt and reviewed role of OT.  Pt does not feel she has OT needs at this time.  Pt is Mod I at baseline..? Defer discharge recommendations to MD.? Will complete orders.

## 2024-09-08 NOTE — PLAN OF CARE
Goal Outcome Evaluation:      Plan of Care Reviewed With: patient      Problem: Adult Inpatient Plan of Care  Goal: Absence of Hospital-Acquired Illness or Injury  Intervention: Prevent Skin Injury  Recent Flowsheet Documentation  Taken 9/8/2024 0017 by Brandy Nicole RN  Body Position: position changed independently     Problem: Pain Acute  Goal: Optimal Pain Control and Function  Outcome: Progressing     Problem: Fall Injury Risk  Goal: Absence of Fall and Fall-Related Injury  Outcome: Progressing    Patient has no complaints, pain controllable with Tylenol. Tele continue monitor. Patient slept during the first 4 hours shift.     Brandy Nicole RN on 9/8/2024 at 4:44 AM

## 2024-09-08 NOTE — PROGRESS NOTES
[facility]   POST-OPERATIVE Progress Note    Place of Service: [facility]    Surgery: POD # 2 status post left ureteral stent placement    Subjective: Patient reports she feels much better.  Does still have flank pain but it is less intense and intermittent now.  Better than yesterday.  Patient denies nausea, vomiting, fever, chills, chest pain or shortness of breath.   Diet: tolerating a diet without nausea or vomiting      Objective  Intake/Output last 24 hrs:    Intake/Output Summary (Last 24 hours) at 9/7/2024 0937  Last data filed at 9/7/2024 0821  Gross per 24 hour   Intake 2105 ml   Output 1400 ml   Net 705 ml       Physical Exam:  Temp:  [98.2  F (36.8  C)-98.4  F (36.9  C)] 98.2  F (36.8  C)  Pulse:  [] 87  Resp:  [16-20] 17  BP: (111-132)/(60-61) 111/61  SpO2:  [92 %-95 %] 95 %  General: NAD, alert, cooperative  Abdomen: soft, non-tender, non-distended. No suprapubic fullness or tenderness.   Genitourinary: Voiding    Pertinent Labs     Creatinine 0.84 WBC 8.4, both of which are from yesterday.    Assessment/Plan  Bina Miller is POD # 2 status post left ureteral stent placement for an obstructing 5 mm left proximal ureteral calculus.  She is much improved.  No compelling evidence for infection but there certainly was evidence for significant inflammation in the perinephric space on the CT scan.  I would recommend at least 1 week of antibiotics in preparation for left ureteroscopy.  We will arrange that as an outpatient.  If she remains stable, she could be discharged today from the urology perspective.  I would hold the warfarin for anticipated surgery in about 1 week.    Fernando Briceno MD  [facility]  Phone #[phone]

## 2024-09-09 VITALS
TEMPERATURE: 98 F | DIASTOLIC BLOOD PRESSURE: 73 MMHG | WEIGHT: 293 LBS | HEIGHT: 68 IN | RESPIRATION RATE: 14 BRPM | HEART RATE: 91 BPM | SYSTOLIC BLOOD PRESSURE: 133 MMHG | BODY MASS INDEX: 44.41 KG/M2 | OXYGEN SATURATION: 94 %

## 2024-09-09 LAB
INR PPP: 1.77 (ref 0.85–1.15)
MAGNESIUM SERPL-MCNC: 2 MG/DL (ref 1.7–2.3)
POTASSIUM SERPL-SCNC: 4.6 MMOL/L (ref 3.4–5.3)

## 2024-09-09 PROCEDURE — 250N000011 HC RX IP 250 OP 636: Performed by: INTERNAL MEDICINE

## 2024-09-09 PROCEDURE — 84132 ASSAY OF SERUM POTASSIUM: CPT | Performed by: INTERNAL MEDICINE

## 2024-09-09 PROCEDURE — 250N000013 HC RX MED GY IP 250 OP 250 PS 637: Performed by: INTERNAL MEDICINE

## 2024-09-09 PROCEDURE — G0378 HOSPITAL OBSERVATION PER HR: HCPCS

## 2024-09-09 PROCEDURE — 96361 HYDRATE IV INFUSION ADD-ON: CPT

## 2024-09-09 PROCEDURE — 83735 ASSAY OF MAGNESIUM: CPT | Performed by: INTERNAL MEDICINE

## 2024-09-09 PROCEDURE — 85610 PROTHROMBIN TIME: CPT | Performed by: STUDENT IN AN ORGANIZED HEALTH CARE EDUCATION/TRAINING PROGRAM

## 2024-09-09 PROCEDURE — 99239 HOSP IP/OBS DSCHRG MGMT >30: CPT | Performed by: INTERNAL MEDICINE

## 2024-09-09 PROCEDURE — 36415 COLL VENOUS BLD VENIPUNCTURE: CPT | Performed by: STUDENT IN AN ORGANIZED HEALTH CARE EDUCATION/TRAINING PROGRAM

## 2024-09-09 RX ORDER — CEFDINIR 300 MG/1
300 CAPSULE ORAL 2 TIMES DAILY
Qty: 8 CAPSULE | Refills: 0 | Status: SHIPPED | OUTPATIENT
Start: 2024-09-09 | End: 2024-09-13

## 2024-09-09 RX ORDER — TAMSULOSIN HYDROCHLORIDE 0.4 MG/1
0.4 CAPSULE ORAL DAILY
Qty: 30 CAPSULE | Refills: 0 | Status: SHIPPED | OUTPATIENT
Start: 2024-09-10

## 2024-09-09 RX ORDER — OXYCODONE HYDROCHLORIDE 5 MG/1
5 TABLET ORAL EVERY 6 HOURS PRN
Qty: 12 TABLET | Refills: 0 | Status: ON HOLD | OUTPATIENT
Start: 2024-09-09 | End: 2024-09-19

## 2024-09-09 RX ADMIN — DULOXETINE HYDROCHLORIDE 40 MG: 20 CAPSULE, DELAYED RELEASE ORAL at 08:29

## 2024-09-09 RX ADMIN — TAMSULOSIN HYDROCHLORIDE 0.4 MG: 0.4 CAPSULE ORAL at 08:28

## 2024-09-09 RX ADMIN — ALLOPURINOL 300 MG: 300 TABLET ORAL at 08:28

## 2024-09-09 RX ADMIN — HYDROXYCHLOROQUINE SULFATE 200 MG: 200 TABLET, FILM COATED ORAL at 08:29

## 2024-09-09 RX ADMIN — FEXOFENADINE HYDROCHLORIDE 180 MG: 60 TABLET ORAL at 08:28

## 2024-09-09 RX ADMIN — CYCLOSPORINE 1 DROP: 0.5 EMULSION OPHTHALMIC at 08:30

## 2024-09-09 RX ADMIN — CEFTRIAXONE SODIUM 2 G: 2 INJECTION, POWDER, FOR SOLUTION INTRAMUSCULAR; INTRAVENOUS at 04:36

## 2024-09-09 RX ADMIN — HYDROMORPHONE HYDROCHLORIDE 2 MG: 2 TABLET ORAL at 11:47

## 2024-09-09 ASSESSMENT — ACTIVITIES OF DAILY LIVING (ADL)
ADLS_ACUITY_SCORE: 38
ADLS_ACUITY_SCORE: 42
ADLS_ACUITY_SCORE: 38
ADLS_ACUITY_SCORE: 38
ADLS_ACUITY_SCORE: 41
ADLS_ACUITY_SCORE: 42
ADLS_ACUITY_SCORE: 42
ADLS_ACUITY_SCORE: 38
ADLS_ACUITY_SCORE: 41
ADLS_ACUITY_SCORE: 38
ADLS_ACUITY_SCORE: 42
ADLS_ACUITY_SCORE: 42
ADLS_ACUITY_SCORE: 38
ADLS_ACUITY_SCORE: 42

## 2024-09-09 NOTE — PLAN OF CARE
Goal Outcome Evaluation:pt alert and oriented, denied pain, ambulates to the bathroom with SBA and walker, purewick in place and effective, VSS, pt discharging home, family here for , discharge instruction given , pt verbalized understanding of discharge instruction , pt wants to verify warfarin order with her cardiologist,  notified.

## 2024-09-09 NOTE — PLAN OF CARE
Problem: Pain Acute  Goal: Optimal Pain Control and Function  Outcome: Progressing   Goal Outcome Evaluation:    Pt c/o pain.  Requested for Dilaudid 2 mg oral.  Pt has tele on and was tachy when she got up to the bathroom and brush her teeth.  On Mag and K+ protocol.  Labs ordered for next AM.  Pure wick applied and Left Peripheral IV dressing was changed.

## 2024-09-09 NOTE — DISCHARGE SUMMARY
"[facility]  Hospitalist Discharge Summary      Date of Admission:  9/5/2024  Date of Discharge:  9/9/2024  2:19 PM  Discharging Provider: Mike Castorena MD  Discharge Service: Hospitalist Service    Discharge Diagnoses   Obstructive uropathy, underwent left ureteral stent placement on 9/6, follow-up urology clinic  Possible UTI, continue antibiotics due to duration 1 week  SAVAGE on CKD resolved continue home meds    Chronic diagnosis  History of gout stable continue allopurinol  History of lupus currently stable  Carotid disease stable  Rheumatoid arthritis continue home meds  Chronic A-fib Coumadin temporarily on hold  Morbid obesity follow-up in primary care  Celiac disease stable  Chronic low back pain continue home meds  Deconditioning  Obstructive sleep apnea on CPAP  Clinically Significant Risk Factors     # Severe Obesity: Estimated body mass index is 65.65 kg/m  as calculated from the following:    Height as of this encounter: 1.727 m (5' 8\").    Weight as of this encounter: 195.9 kg (431 lb 12.8 oz).       Follow-ups Needed After Discharge   Follow-up Appointments     Follow-up and recommended labs and tests       Follow up with primary care provider, [facility],   within 7 days for hospital follow- up.  The following labs/tests are   recommended: cbc/bmp.    Follow up with Dr. Chavez urology clinic  , at (location with clinic   name or city) , within 1 week   for hospital follow- up. No follow up labs   or test are needed.        Follow-up and recommended labs and tests       [facility] will call you to arrange stone management surgery in 1-2   weeks. You may confirm your appointment by calling [facility] at   [phone].        PCP and urology follow-up    Unresulted Labs Ordered in the Past 30 Days of this Admission       No orders found from 8/6/2024 to 9/6/2024.        These results will be followed up by above    Discharge Disposition   Discharged to " home  Condition at discharge: Stable    Hospital Course   Admitted to the hospital for left renal angle pain and weakness, CT showing obstructive left proximal ureteral calculi with mild hydronephrosis, not septic, underwent left ureteral stent placement on 9/6.  Uncomplicated procedure.  Tamsulosin started.  UA no classic UTI however CT showed perinephric stranding likely from inflammation so urology recommended 1-week total duration of antibiotics.  urine culture with mixed jose.  She is planned to have follow-up urology clinic for formal stone treatment, urology recommend hold Coumadin temporarily till then.  Patient is aware of this, risk of CVA while off heparin, patient to resume Coumadin when okay by urology, PCP to follow    Requested low-dose oxycodone for pain control which she was given.  Follow-up with PCP and urology as above    Consultations This Hospital Stay   UROLOGY IP CONSULT  PHARMACY TO DOSE WARFARIN  PHYSICAL THERAPY ADULT IP CONSULT  OCCUPATIONAL THERAPY ADULT IP CONSULT    Code Status   Full Code    Time Spent on this Encounter   I, Mike Castorena MD, personally saw the patient today and spent greater than 30 minutes discharging this patient.       Mike Castorena MD  [place] [id number]  Phone: [phone]  Fax: [phone]  ______________________________________________________________________    Physical Exam   Vital Signs: Temp: 98  F (36.7  C) Temp src: Oral BP: 133/73 Pulse: 91   Resp: 14 SpO2: 94 % O2 Device: None (Room air)    Weight: 431 lbs 12.8 oz  General Appearance: AAOx3, very high BMI  Respiratory: Clear anteriorly  Cardiovascular: S1-S2 only  GI: Soft nontender bowel sounds.  No distention  Skin: No erythema  Other: No edema bilaterally       Primary Care Physician   [facility]    Discharge Orders      Reason for your hospital stay    UTI/L pyelo/ L ureteral stent     Follow-up and  recommended labs and tests     Follow up with primary care provider, [facility], within 7 days for hospital follow- up.  The following labs/tests are recommended: cbc/bmp.    Follow up with Dr. Chavez urology clinic  , at (location with clinic name or city) , within 1 week   for hospital follow- up. No follow up labs or test are needed.     Activity    Your activity upon discharge: activity as tolerated     Discharge Instructions    COUMADIN ON HOLD TILL S/B UROLOGY THEN RESUME PER THEM     Reason for your hospital stay    S/p cystoscopy with left ureteral stent placement.     Follow-up and recommended labs and tests     [facility] will call you to arrange stone management surgery in 1-2 weeks. You may confirm your appointment by calling [facility] at [phone].     Activity    Your activity upon discharge: Resume activities as tolerated.     Discharge Instructions    - While you were in the hospital you had left ureteral stent(s) placed.  - A ureteral stent, is a small hollow drainage tube that since inside the ureter. One tip of the stent curls in the kidney and the other in the bladder to keep the stent in place.  - You may notice stent irritation in the form of: urinary frequency, urinary urgency, burning when you urinate, an ache in the back or pelvis.    What you can do to help with these symptoms:  - Minimize activity  - Take a warm bath  - Take pain medications as prescribed  - There are some prescription medications that may help such as flomax, pyridium, detrol and ditropan     Diet    Follow this diet upon discharge: Current Diet:Orders Placed This Encounter      Regular Diet Adult       Significant Results and Procedures   Results for orders placed or performed during the hospital encounter of 09/05/24   CT Abdomen Pelvis w Contrast    Narrative    EXAM: CT ABDOMEN PELVIS W CONTRAST  LOCATION: [facility]  DATE: 9/5/2024    INDICATION: LLQ  pain  COMPARISON: None.  TECHNIQUE: CT scan of the abdomen and pelvis was performed following injection of IV contrast. Multiplanar reformats were obtained. Dose reduction techniques were used.  CONTRAST: 75 mL Isovue 370    FINDINGS:   LOWER CHEST: Discoid atelectasis is seen in the lingula.    HEPATOBILIARY: No significant mass or bile duct dilatation. No calcified gallstones.     PANCREAS: No significant mass, duct dilatation, or inflammatory change.    SPLEEN: Normal size.    ADRENAL GLANDS: No significant nodules.    KIDNEYS/BLADDER: There is an obstructive 5 x 2 x 4 mm calculi seen in the left proximal ureter just distal to the ureteropelvic junction (image 120, series 3; image 62, series 4) with proximal hydroureteronephrosis and perinephric as well as periureteral   stranding. The remainder of the ureter tapers normally throughout its course. The right kidney and ureter are unremarkable.    BOWEL: Diverticulosis of the colon. No acute inflammatory change. No obstruction.     LYMPH NODES: No lymphadenopathy.    VASCULATURE: No abdominal aortic aneurysm.    PELVIC ORGANS: No pelvic masses.    MUSCULOSKELETAL: Unremarkable.      Impression    IMPRESSION:   1.  Obstructive 5 x 2 x 4 mm left proximal ureteral calculi, just distal to the ureteropelvic junction, with proximal hydroureteronephrosis. Additionally there is perinephric and periureteral stranding which is likely due to inflammation secondary to the   obstructed ureter although superimposed urinary tract is and/or pyelonephritis cannot be completely excluded. Correlation with urinalysis and symptomatology is recommended.  2.  Scattered colonic diverticulosis without evidence of diverticulitis   XR Surgery PIERRE L/T 5 Min Fluoro    Narrative    This exam was marked as non-reportable because it will not be read by a   radiologist or a [place] non-radiologist provider.             Discharge Medications   Discharge Medication List as of 9/9/2024  1:58 PM         START taking these medications    Details   cefdinir (OMNICEF) 300 MG capsule Take 1 capsule (300 mg) by mouth 2 times daily for 4 days., Disp-8 capsule, R-0, E-Prescribe      oxyCODONE (ROXICODONE) 5 MG tablet Take 1 tablet (5 mg) by mouth every 6 hours as needed for pain., Disp-12 tablet, R-0, Local Print      tamsulosin (FLOMAX) 0.4 MG capsule Take 1 capsule (0.4 mg) by mouth daily., Disp-30 capsule, R-0, E-Prescribe           CONTINUE these medications which have NOT CHANGED    Details   allopurinol (ZYLOPRIM) 300 MG tablet [ALLOPURINOL (ZYLOPRIM) 300 MG TABLET] Take 300 mg by mouth daily., Historical      DULoxetine (CYMBALTA) 20 MG capsule Take 2 capsules by mouth daily., Historical      fexofenadine (ALLEGRA) 180 MG tablet Take 180 mg by mouth daily., Historical      fluticasone (FLONASE) 50 mcg/actuation nasal spray Spray 1 spray into both nostrils daily., Historical      hydroxychloroquine (PLAQUENIL) 200 mg tablet [HYDROXYCHLOROQUINE (PLAQUENIL) 200 MG TABLET] Take 200 mg by mouth 2 (two) times a day., Historical      losartan (COZAAR) 25 MG tablet [LOSARTAN (COZAAR) 25 MG TABLET] Take 25 mg by mouth daily., Historical      LOTEMAX 0.5 % DrpG [LOTEMAX 0.5 % DRPG] as needed., Historical      Naltrexone HCl, Pain, 4.5 MG CAPS Take 1 capsule by mouth daily., Historical      RESTASIS 0.05 % ophthalmic emulsion Place 1 drop into both eyes 2 times daily., Historical      verapamil (CALAN-SR) 180 MG CR tablet Take 180 mg by mouth at bedtime., Historical           STOP taking these medications       warfarin ANTICOAGULANT (COUMADIN) 5 MG tablet Comments:   Reason for Stopping:         warfarin ANTICOAGULANT (COUMADIN) 5 MG tablet Comments:   Reason for Stopping:             Allergies   Allergies   Allergen Reactions    Ace Inhibitors Cough    Ibuprofen Hives    Keflex [Cephalexin] Other (See Comments) and Headache     Light sensitivity    Novocain [Procaine] Hives    Penicillins Other (See Comments)      Patient does not take as she has a strong family history anaphylaxis in the family.

## 2024-09-09 NOTE — PLAN OF CARE
Problem: Adult Inpatient Plan of Care  Goal: Optimal Comfort and Wellbeing  Outcome: Progressing     Problem: Pain Acute  Goal: Optimal Pain Control and Function  Outcome: Progressing     Problem: Nausea and Vomiting  Goal: Nausea and Vomiting Relief  Outcome: Progressing   Goal Outcome Evaluation:       Pt is A/O x 4, denies pain and discomfort. Pure wick in place. Tele sinus rhythm, VSS continue to monitor. Kari Tamez RN

## 2024-09-17 RX ORDER — WARFARIN SODIUM 5 MG/1
TABLET ORAL SEE ADMIN INSTRUCTIONS
COMMUNITY

## 2024-09-19 ENCOUNTER — APPOINTMENT (OUTPATIENT)
Dept: RADIOLOGY | Facility: HOSPITAL | Age: 59
End: 2024-09-19
Attending: UROLOGY
Payer: COMMERCIAL

## 2024-09-19 ENCOUNTER — ANESTHESIA EVENT (OUTPATIENT)
Dept: SURGERY | Facility: HOSPITAL | Age: 59
End: 2024-09-19
Payer: COMMERCIAL

## 2024-09-19 ENCOUNTER — HOSPITAL ENCOUNTER (OUTPATIENT)
Facility: HOSPITAL | Age: 59
Discharge: HOME | End: 2024-09-20
Attending: UROLOGY | Admitting: UROLOGY
Payer: COMMERCIAL

## 2024-09-19 ENCOUNTER — ANESTHESIA (OUTPATIENT)
Dept: SURGERY | Facility: HOSPITAL | Age: 59
End: 2024-09-19
Payer: COMMERCIAL

## 2024-09-19 DIAGNOSIS — N20.0 KIDNEY STONE: Primary | ICD-10-CM

## 2024-09-19 LAB
ANION GAP SERPL CALCULATED.3IONS-SCNC: 16 MMOL/L (ref 7–15)
BUN SERPL-MCNC: 13.1 MG/DL (ref 8–23)
CALCIUM SERPL-MCNC: 9.8 MG/DL (ref 8.8–10.4)
CHLORIDE SERPL-SCNC: 104 MMOL/L (ref 98–107)
CREAT SERPL-MCNC: 0.79 MG/DL (ref 0.51–0.95)
EGFRCR SERPLBLD CKD-EPI 2021: 85 ML/MIN/1.73M2
ERYTHROCYTE [DISTWIDTH] IN BLOOD BY AUTOMATED COUNT: 17.2 % (ref 10–15)
GLUCOSE SERPL-MCNC: 142 MG/DL (ref 70–99)
HCO3 SERPL-SCNC: 18 MMOL/L (ref 22–29)
HCT VFR BLD AUTO: 39.1 % (ref 35–47)
HGB BLD-MCNC: 12.6 G/DL (ref 11.7–15.7)
HOLD SPECIMEN: NORMAL
HOLD SPECIMEN: NORMAL
INR PPP: 0.94 (ref 0.85–1.15)
MCH RBC QN AUTO: 29.1 PG (ref 26.5–33)
MCHC RBC AUTO-ENTMCNC: 32.2 G/DL (ref 31.5–36.5)
MCV RBC AUTO: 90 FL (ref 78–100)
PLATELET # BLD AUTO: 218 10E3/UL (ref 150–450)
POTASSIUM SERPL-SCNC: 4.7 MMOL/L (ref 3.4–5.3)
RBC # BLD AUTO: 4.33 10E6/UL (ref 3.8–5.2)
SODIUM SERPL-SCNC: 138 MMOL/L (ref 135–145)
WBC # BLD AUTO: 6.8 10E3/UL (ref 4–11)

## 2024-09-19 PROCEDURE — 82365 CALCULUS SPECTROSCOPY: CPT | Performed by: UROLOGY

## 2024-09-19 PROCEDURE — 85610 PROTHROMBIN TIME: CPT | Performed by: STUDENT IN AN ORGANIZED HEALTH CARE EDUCATION/TRAINING PROGRAM

## 2024-09-19 PROCEDURE — 99417 PROLNG OP E/M EACH 15 MIN: CPT | Performed by: STUDENT IN AN ORGANIZED HEALTH CARE EDUCATION/TRAINING PROGRAM

## 2024-09-19 PROCEDURE — 999N000141 HC STATISTIC PRE-PROCEDURE NURSING ASSESSMENT: Performed by: UROLOGY

## 2024-09-19 PROCEDURE — 258N000003 HC RX IP 258 OP 636: Performed by: NURSE ANESTHETIST, CERTIFIED REGISTERED

## 2024-09-19 PROCEDURE — C2617 STENT, NON-COR, TEM W/O DEL: HCPCS | Performed by: UROLOGY

## 2024-09-19 PROCEDURE — 272N000001 HC OR GENERAL SUPPLY STERILE: Performed by: UROLOGY

## 2024-09-19 PROCEDURE — 250N000011 HC RX IP 250 OP 636: Performed by: UROLOGY

## 2024-09-19 PROCEDURE — C1769 GUIDE WIRE: HCPCS | Performed by: UROLOGY

## 2024-09-19 PROCEDURE — 255N000002 HC RX 255 OP 636: Performed by: UROLOGY

## 2024-09-19 PROCEDURE — 250N000013 HC RX MED GY IP 250 OP 250 PS 637: Performed by: INTERNAL MEDICINE

## 2024-09-19 PROCEDURE — C1894 INTRO/SHEATH, NON-LASER: HCPCS | Performed by: UROLOGY

## 2024-09-19 PROCEDURE — 999N000248 HC STATISTIC IV INSERT WITH US BY RN

## 2024-09-19 PROCEDURE — 250N000013 HC RX MED GY IP 250 OP 250 PS 637: Performed by: NURSE PRACTITIONER

## 2024-09-19 PROCEDURE — 80048 BASIC METABOLIC PNL TOTAL CA: CPT | Performed by: STUDENT IN AN ORGANIZED HEALTH CARE EDUCATION/TRAINING PROGRAM

## 2024-09-19 PROCEDURE — 999N000180 XR SURGERY CARM FLUORO LESS THAN 5 MIN: Mod: TC

## 2024-09-19 PROCEDURE — 250N000011 HC RX IP 250 OP 636: Performed by: NURSE ANESTHETIST, CERTIFIED REGISTERED

## 2024-09-19 PROCEDURE — 710N000009 HC RECOVERY PHASE 1, LEVEL 1, PER MIN: Performed by: UROLOGY

## 2024-09-19 PROCEDURE — 250N000011 HC RX IP 250 OP 636: Performed by: NURSE PRACTITIONER

## 2024-09-19 PROCEDURE — 99215 OFFICE O/P EST HI 40 MIN: CPT | Performed by: STUDENT IN AN ORGANIZED HEALTH CARE EDUCATION/TRAINING PROGRAM

## 2024-09-19 PROCEDURE — 52005 CYSTO W/URTRL CATHJ: CPT | Performed by: ANESTHESIOLOGY

## 2024-09-19 PROCEDURE — 258N000003 HC RX IP 258 OP 636: Performed by: UROLOGY

## 2024-09-19 PROCEDURE — 370N000017 HC ANESTHESIA TECHNICAL FEE, PER MIN: Performed by: UROLOGY

## 2024-09-19 PROCEDURE — 258N000003 HC RX IP 258 OP 636: Performed by: ANESTHESIOLOGY

## 2024-09-19 PROCEDURE — 52005 CYSTO W/URTRL CATHJ: CPT | Performed by: REGISTERED NURSE

## 2024-09-19 PROCEDURE — 250N000011 HC RX IP 250 OP 636: Performed by: ANESTHESIOLOGY

## 2024-09-19 PROCEDURE — 250N000013 HC RX MED GY IP 250 OP 250 PS 637: Performed by: STUDENT IN AN ORGANIZED HEALTH CARE EDUCATION/TRAINING PROGRAM

## 2024-09-19 PROCEDURE — 85027 COMPLETE CBC AUTOMATED: CPT | Performed by: STUDENT IN AN ORGANIZED HEALTH CARE EDUCATION/TRAINING PROGRAM

## 2024-09-19 PROCEDURE — 250N000025 HC SEVOFLURANE, PER MIN: Performed by: UROLOGY

## 2024-09-19 PROCEDURE — 250N000009 HC RX 250: Performed by: NURSE ANESTHETIST, CERTIFIED REGISTERED

## 2024-09-19 PROCEDURE — 360N000084 HC SURGERY LEVEL 4 W/ FLUORO, PER MIN: Performed by: UROLOGY

## 2024-09-19 PROCEDURE — 36415 COLL VENOUS BLD VENIPUNCTURE: CPT | Performed by: STUDENT IN AN ORGANIZED HEALTH CARE EDUCATION/TRAINING PROGRAM

## 2024-09-19 PROCEDURE — 250N000013 HC RX MED GY IP 250 OP 250 PS 637: Performed by: UROLOGY

## 2024-09-19 PROCEDURE — 258N000001 HC RX 258: Performed by: UROLOGY

## 2024-09-19 DEVICE — URETERAL STENT
Type: IMPLANTABLE DEVICE | Site: URETER | Status: FUNCTIONAL
Brand: PERCUFLEX™ PLUS

## 2024-09-19 RX ORDER — ONDANSETRON 4 MG/1
4 TABLET, ORALLY DISINTEGRATING ORAL EVERY 6 HOURS PRN
Status: DISCONTINUED | OUTPATIENT
Start: 2024-09-19 | End: 2024-09-20 | Stop reason: HOSPADM

## 2024-09-19 RX ORDER — BISACODYL 10 MG
10 SUPPOSITORY, RECTAL RECTAL DAILY PRN
Status: DISCONTINUED | OUTPATIENT
Start: 2024-09-22 | End: 2024-09-20 | Stop reason: HOSPADM

## 2024-09-19 RX ORDER — POLYETHYLENE GLYCOL 3350 17 G/17G
17 POWDER, FOR SOLUTION ORAL DAILY
Status: DISCONTINUED | OUTPATIENT
Start: 2024-09-20 | End: 2024-09-20 | Stop reason: HOSPADM

## 2024-09-19 RX ORDER — CARBOXYMETHYLCELLULOSE SODIUM 5 MG/ML
SOLUTION/ DROPS OPHTHALMIC
Status: ON HOLD | COMMUNITY
End: 2024-09-19

## 2024-09-19 RX ORDER — DEXAMETHASONE SODIUM PHOSPHATE 4 MG/ML
4 INJECTION, SOLUTION INTRA-ARTICULAR; INTRALESIONAL; INTRAMUSCULAR; INTRAVENOUS; SOFT TISSUE
Status: DISCONTINUED | OUTPATIENT
Start: 2024-09-19 | End: 2024-09-19 | Stop reason: HOSPADM

## 2024-09-19 RX ORDER — SODIUM CHLORIDE 9 MG/ML
INJECTION, SOLUTION INTRAVENOUS CONTINUOUS
Status: DISCONTINUED | OUTPATIENT
Start: 2024-09-19 | End: 2024-09-20 | Stop reason: HOSPADM

## 2024-09-19 RX ORDER — NALOXONE HYDROCHLORIDE 0.4 MG/ML
0.2 INJECTION, SOLUTION INTRAMUSCULAR; INTRAVENOUS; SUBCUTANEOUS
Status: DISCONTINUED | OUTPATIENT
Start: 2024-09-19 | End: 2024-09-20 | Stop reason: HOSPADM

## 2024-09-19 RX ORDER — PREDNISOLONE ACETATE 10 MG/ML
1 SUSPENSION/ DROPS OPHTHALMIC 4 TIMES DAILY PRN
COMMUNITY

## 2024-09-19 RX ORDER — ALBUTEROL SULFATE 90 UG/1
2 AEROSOL, METERED RESPIRATORY (INHALATION) EVERY 6 HOURS PRN
COMMUNITY

## 2024-09-19 RX ORDER — PROCHLORPERAZINE MALEATE 10 MG
10 TABLET ORAL EVERY 6 HOURS PRN
Status: DISCONTINUED | OUTPATIENT
Start: 2024-09-19 | End: 2024-09-20 | Stop reason: HOSPADM

## 2024-09-19 RX ORDER — MINERAL OIL, PETROLATUM 425; 573 MG/G; MG/G
1 OINTMENT OPHTHALMIC AT BEDTIME
Status: DISCONTINUED | OUTPATIENT
Start: 2024-09-19 | End: 2024-09-20 | Stop reason: HOSPADM

## 2024-09-19 RX ORDER — SODIUM CHLORIDE, SODIUM LACTATE, POTASSIUM CHLORIDE, CALCIUM CHLORIDE 600; 310; 30; 20 MG/100ML; MG/100ML; MG/100ML; MG/100ML
INJECTION, SOLUTION INTRAVENOUS CONTINUOUS
Status: DISCONTINUED | OUTPATIENT
Start: 2024-09-19 | End: 2024-09-19 | Stop reason: HOSPADM

## 2024-09-19 RX ORDER — CEFAZOLIN SODIUM/WATER 3 G/30 ML
3 SYRINGE (ML) INTRAVENOUS EVERY 8 HOURS
Status: DISCONTINUED | OUTPATIENT
Start: 2024-09-19 | End: 2024-09-19 | Stop reason: DRUGHIGH

## 2024-09-19 RX ORDER — DEXAMETHASONE SODIUM PHOSPHATE 10 MG/ML
INJECTION, SOLUTION INTRAMUSCULAR; INTRAVENOUS PRN
Status: DISCONTINUED | OUTPATIENT
Start: 2024-09-19 | End: 2024-09-19

## 2024-09-19 RX ORDER — LABETALOL HYDROCHLORIDE 5 MG/ML
5 INJECTION, SOLUTION INTRAVENOUS EVERY 10 MIN PRN
Status: DISCONTINUED | OUTPATIENT
Start: 2024-09-19 | End: 2024-09-19 | Stop reason: HOSPADM

## 2024-09-19 RX ORDER — CEFAZOLIN SODIUM/WATER 3 G/30 ML
3 SYRINGE (ML) INTRAVENOUS SEE ADMIN INSTRUCTIONS
Status: DISCONTINUED | OUTPATIENT
Start: 2024-09-19 | End: 2024-09-19 | Stop reason: HOSPADM

## 2024-09-19 RX ORDER — PREDNISOLONE ACETATE 10 MG/ML
1 SUSPENSION/ DROPS OPHTHALMIC 4 TIMES DAILY PRN
Status: DISCONTINUED | OUTPATIENT
Start: 2024-09-19 | End: 2024-09-20 | Stop reason: HOSPADM

## 2024-09-19 RX ORDER — OXYCODONE HYDROCHLORIDE 5 MG/1
10 TABLET ORAL EVERY 4 HOURS PRN
Status: CANCELLED | OUTPATIENT
Start: 2024-09-19

## 2024-09-19 RX ORDER — ONDANSETRON 2 MG/ML
INJECTION INTRAMUSCULAR; INTRAVENOUS PRN
Status: DISCONTINUED | OUTPATIENT
Start: 2024-09-19 | End: 2024-09-19

## 2024-09-19 RX ORDER — HYDROMORPHONE HCL IN WATER/PF 6 MG/30 ML
0.2 PATIENT CONTROLLED ANALGESIA SYRINGE INTRAVENOUS EVERY 5 MIN PRN
Status: DISCONTINUED | OUTPATIENT
Start: 2024-09-19 | End: 2024-09-19

## 2024-09-19 RX ORDER — HYDROXYCHLOROQUINE SULFATE 200 MG/1
200 TABLET, FILM COATED ORAL 2 TIMES DAILY
Status: DISCONTINUED | OUTPATIENT
Start: 2024-09-19 | End: 2024-09-19

## 2024-09-19 RX ORDER — NALOXONE HYDROCHLORIDE 0.4 MG/ML
0.4 INJECTION, SOLUTION INTRAMUSCULAR; INTRAVENOUS; SUBCUTANEOUS
Status: DISCONTINUED | OUTPATIENT
Start: 2024-09-19 | End: 2024-09-20 | Stop reason: HOSPADM

## 2024-09-19 RX ORDER — KETOCONAZOLE 20 MG/G
CREAM TOPICAL 2 TIMES DAILY PRN
COMMUNITY

## 2024-09-19 RX ORDER — CHLORAL HYDRATE 500 MG
3 CAPSULE ORAL DAILY
COMMUNITY

## 2024-09-19 RX ORDER — ALBUTEROL SULFATE 90 UG/1
2 AEROSOL, METERED RESPIRATORY (INHALATION) EVERY 6 HOURS PRN
Status: DISCONTINUED | OUTPATIENT
Start: 2024-09-19 | End: 2024-09-20 | Stop reason: HOSPADM

## 2024-09-19 RX ORDER — LIDOCAINE 40 MG/G
CREAM TOPICAL
Status: DISCONTINUED | OUTPATIENT
Start: 2024-09-19 | End: 2024-09-20 | Stop reason: HOSPADM

## 2024-09-19 RX ORDER — CEFAZOLIN SODIUM/WATER 3 G/30 ML
3 SYRINGE (ML) INTRAVENOUS
Status: COMPLETED | OUTPATIENT
Start: 2024-09-19 | End: 2024-09-19

## 2024-09-19 RX ORDER — ONDANSETRON 2 MG/ML
4 INJECTION INTRAMUSCULAR; INTRAVENOUS EVERY 6 HOURS PRN
Status: DISCONTINUED | OUTPATIENT
Start: 2024-09-19 | End: 2024-09-20 | Stop reason: HOSPADM

## 2024-09-19 RX ORDER — OXYCODONE HYDROCHLORIDE 5 MG/1
5 TABLET ORAL EVERY 4 HOURS PRN
Status: DISCONTINUED | OUTPATIENT
Start: 2024-09-19 | End: 2024-09-20 | Stop reason: HOSPADM

## 2024-09-19 RX ORDER — ACETAMINOPHEN 325 MG/1
975 TABLET ORAL ONCE
Status: COMPLETED | OUTPATIENT
Start: 2024-09-19 | End: 2024-09-19

## 2024-09-19 RX ORDER — HYDROXYCHLOROQUINE SULFATE 200 MG/1
200 TABLET, FILM COATED ORAL 2 TIMES DAILY
Status: DISCONTINUED | OUTPATIENT
Start: 2024-09-19 | End: 2024-09-20 | Stop reason: HOSPADM

## 2024-09-19 RX ORDER — ASCORBIC ACID 500 MG
500 TABLET ORAL DAILY
COMMUNITY

## 2024-09-19 RX ORDER — CHOLECALCIFEROL (VITAMIN D3) 50 MCG
1 TABLET ORAL 2 TIMES DAILY
COMMUNITY

## 2024-09-19 RX ORDER — LIDOCAINE 40 MG/G
CREAM TOPICAL
Status: DISCONTINUED | OUTPATIENT
Start: 2024-09-19 | End: 2024-09-19 | Stop reason: HOSPADM

## 2024-09-19 RX ORDER — MORPHINE SULFATE 4 MG/ML
2 INJECTION, SOLUTION INTRAMUSCULAR; INTRAVENOUS
Status: CANCELLED | OUTPATIENT
Start: 2024-09-19

## 2024-09-19 RX ORDER — KETOCONAZOLE 20 MG/G
CREAM TOPICAL 2 TIMES DAILY PRN
Status: DISCONTINUED | OUTPATIENT
Start: 2024-09-19 | End: 2024-09-20 | Stop reason: HOSPADM

## 2024-09-19 RX ORDER — NALOXONE HYDROCHLORIDE 0.4 MG/ML
0.1 INJECTION, SOLUTION INTRAMUSCULAR; INTRAVENOUS; SUBCUTANEOUS
Status: DISCONTINUED | OUTPATIENT
Start: 2024-09-19 | End: 2024-09-19 | Stop reason: HOSPADM

## 2024-09-19 RX ORDER — MULTIVITAMIN
1 TABLET ORAL DAILY
COMMUNITY

## 2024-09-19 RX ORDER — LOSARTAN POTASSIUM 25 MG/1
25 TABLET ORAL EVERY EVENING
Status: DISCONTINUED | OUTPATIENT
Start: 2024-09-19 | End: 2024-09-20 | Stop reason: HOSPADM

## 2024-09-19 RX ORDER — ONDANSETRON 2 MG/ML
4 INJECTION INTRAMUSCULAR; INTRAVENOUS EVERY 30 MIN PRN
Status: DISCONTINUED | OUTPATIENT
Start: 2024-09-19 | End: 2024-09-19 | Stop reason: HOSPADM

## 2024-09-19 RX ORDER — MINERAL OIL, PETROLATUM 425; 573 MG/G; MG/G
1 OINTMENT OPHTHALMIC AT BEDTIME
COMMUNITY

## 2024-09-19 RX ORDER — ONDANSETRON 4 MG/1
4 TABLET, ORALLY DISINTEGRATING ORAL EVERY 30 MIN PRN
Status: DISCONTINUED | OUTPATIENT
Start: 2024-09-19 | End: 2024-09-19 | Stop reason: HOSPADM

## 2024-09-19 RX ORDER — ACETAMINOPHEN 325 MG/1
975 TABLET ORAL ONCE
Status: DISCONTINUED | OUTPATIENT
Start: 2024-09-19 | End: 2024-09-19

## 2024-09-19 RX ORDER — ACETAMINOPHEN 325 MG/1
650 TABLET ORAL EVERY 4 HOURS PRN
Status: DISCONTINUED | OUTPATIENT
Start: 2024-09-22 | End: 2024-09-20 | Stop reason: HOSPADM

## 2024-09-19 RX ORDER — PROPOFOL 10 MG/ML
INJECTION, EMULSION INTRAVENOUS PRN
Status: DISCONTINUED | OUTPATIENT
Start: 2024-09-19 | End: 2024-09-19

## 2024-09-19 RX ORDER — ALLOPURINOL 300 MG/1
300 TABLET ORAL DAILY
Status: DISCONTINUED | OUTPATIENT
Start: 2024-09-19 | End: 2024-09-20 | Stop reason: HOSPADM

## 2024-09-19 RX ORDER — OXYCODONE HYDROCHLORIDE 5 MG/1
5 TABLET ORAL
Status: DISCONTINUED | OUTPATIENT
Start: 2024-09-19 | End: 2024-09-19 | Stop reason: HOSPADM

## 2024-09-19 RX ORDER — WARFARIN SODIUM 5 MG/1
10 TABLET ORAL ONCE
Status: COMPLETED | OUTPATIENT
Start: 2024-09-19 | End: 2024-09-19

## 2024-09-19 RX ORDER — CARBOXYMETHYLCELLULOSE SODIUM 5 MG/ML
1 SOLUTION/ DROPS OPHTHALMIC 2 TIMES DAILY PRN
Status: DISCONTINUED | OUTPATIENT
Start: 2024-09-19 | End: 2024-09-20 | Stop reason: HOSPADM

## 2024-09-19 RX ORDER — ACETAMINOPHEN 650 MG/1
650 SUPPOSITORY RECTAL ONCE
Status: COMPLETED | OUTPATIENT
Start: 2024-09-19 | End: 2024-09-19

## 2024-09-19 RX ORDER — PROPOFOL 10 MG/ML
INJECTION, EMULSION INTRAVENOUS CONTINUOUS PRN
Status: DISCONTINUED | OUTPATIENT
Start: 2024-09-19 | End: 2024-09-19

## 2024-09-19 RX ORDER — KETAMINE HYDROCHLORIDE 10 MG/ML
INJECTION INTRAMUSCULAR; INTRAVENOUS PRN
Status: DISCONTINUED | OUTPATIENT
Start: 2024-09-19 | End: 2024-09-19

## 2024-09-19 RX ORDER — CEFAZOLIN SODIUM 2 G/100ML
2 INJECTION, SOLUTION INTRAVENOUS EVERY 8 HOURS
Status: DISCONTINUED | OUTPATIENT
Start: 2024-09-19 | End: 2024-09-20 | Stop reason: HOSPADM

## 2024-09-19 RX ORDER — AMOXICILLIN 250 MG
1 CAPSULE ORAL 2 TIMES DAILY
Status: DISCONTINUED | OUTPATIENT
Start: 2024-09-19 | End: 2024-09-20 | Stop reason: HOSPADM

## 2024-09-19 RX ORDER — OXYCODONE HYDROCHLORIDE 5 MG/1
5 TABLET ORAL EVERY 4 HOURS PRN
Status: CANCELLED | OUTPATIENT
Start: 2024-09-19

## 2024-09-19 RX ORDER — CEFAZOLIN SODIUM 1 G
2 VIAL (EA) INJECTION EVERY 8 HOURS
Status: DISCONTINUED | OUTPATIENT
Start: 2024-09-19 | End: 2024-09-19

## 2024-09-19 RX ORDER — ACETAMINOPHEN 325 MG/1
975 TABLET ORAL EVERY 8 HOURS
Status: DISCONTINUED | OUTPATIENT
Start: 2024-09-19 | End: 2024-09-20 | Stop reason: HOSPADM

## 2024-09-19 RX ORDER — FEXOFENADINE HCL 180 MG/1
180 TABLET ORAL DAILY
Status: DISCONTINUED | OUTPATIENT
Start: 2024-09-19 | End: 2024-09-20 | Stop reason: HOSPADM

## 2024-09-19 RX ORDER — OXYCODONE HYDROCHLORIDE 5 MG/1
10 TABLET ORAL
Status: DISCONTINUED | OUTPATIENT
Start: 2024-09-19 | End: 2024-09-19 | Stop reason: HOSPADM

## 2024-09-19 RX ORDER — DULOXETIN HYDROCHLORIDE 20 MG/1
40 CAPSULE, DELAYED RELEASE ORAL DAILY
Status: DISCONTINUED | OUTPATIENT
Start: 2024-09-19 | End: 2024-09-20 | Stop reason: HOSPADM

## 2024-09-19 RX ORDER — CYCLOSPORINE 0.5 MG/ML
1 EMULSION OPHTHALMIC 2 TIMES DAILY
Status: DISCONTINUED | OUTPATIENT
Start: 2024-09-19 | End: 2024-09-20 | Stop reason: HOSPADM

## 2024-09-19 RX ORDER — MORPHINE SULFATE 4 MG/ML
1 INJECTION, SOLUTION INTRAMUSCULAR; INTRAVENOUS
Status: CANCELLED | OUTPATIENT
Start: 2024-09-19

## 2024-09-19 RX ORDER — VERAPAMIL HYDROCHLORIDE 180 MG/1
180 TABLET, EXTENDED RELEASE ORAL DAILY
Status: DISCONTINUED | OUTPATIENT
Start: 2024-09-20 | End: 2024-09-20 | Stop reason: HOSPADM

## 2024-09-19 RX ORDER — HYDROMORPHONE HYDROCHLORIDE 1 MG/ML
0.5 INJECTION, SOLUTION INTRAMUSCULAR; INTRAVENOUS; SUBCUTANEOUS EVERY 5 MIN PRN
Status: DISCONTINUED | OUTPATIENT
Start: 2024-09-19 | End: 2024-09-19

## 2024-09-19 RX ORDER — LACTOBACILLUS RHAMNOSUS GG 10B CELL
1 CAPSULE ORAL DAILY
COMMUNITY

## 2024-09-19 RX ORDER — LIDOCAINE HYDROCHLORIDE 10 MG/ML
INJECTION, SOLUTION INFILTRATION; PERINEURAL PRN
Status: DISCONTINUED | OUTPATIENT
Start: 2024-09-19 | End: 2024-09-19

## 2024-09-19 RX ORDER — FENTANYL CITRATE 50 UG/ML
25 INJECTION, SOLUTION INTRAMUSCULAR; INTRAVENOUS
Status: DISCONTINUED | OUTPATIENT
Start: 2024-09-19 | End: 2024-09-19 | Stop reason: HOSPADM

## 2024-09-19 RX ORDER — OXYCODONE HYDROCHLORIDE 5 MG/1
5 TABLET ORAL EVERY 6 HOURS PRN
Status: DISCONTINUED | OUTPATIENT
Start: 2024-09-19 | End: 2024-09-19

## 2024-09-19 RX ORDER — MORPHINE SULFATE 4 MG/ML
.5-2 INJECTION, SOLUTION INTRAMUSCULAR; INTRAVENOUS
Status: DISCONTINUED | OUTPATIENT
Start: 2024-09-19 | End: 2024-09-20 | Stop reason: HOSPADM

## 2024-09-19 RX ADMIN — DULOXETINE HYDROCHLORIDE 40 MG: 20 CAPSULE, DELAYED RELEASE ORAL at 21:12

## 2024-09-19 RX ADMIN — LIDOCAINE HYDROCHLORIDE 5 ML: 10 INJECTION, SOLUTION INFILTRATION; PERINEURAL at 15:04

## 2024-09-19 RX ADMIN — DEXAMETHASONE SODIUM PHOSPHATE 10 MG: 10 INJECTION, SOLUTION INTRAMUSCULAR; INTRAVENOUS at 15:18

## 2024-09-19 RX ADMIN — HYDROXYCHLOROQUINE SULFATE 200 MG: 200 TABLET, FILM COATED ORAL at 22:39

## 2024-09-19 RX ADMIN — ACETAMINOPHEN 975 MG: 325 TABLET ORAL at 14:05

## 2024-09-19 RX ADMIN — SODIUM CHLORIDE: 9 INJECTION, SOLUTION INTRAVENOUS at 19:01

## 2024-09-19 RX ADMIN — HYDROMORPHONE HYDROCHLORIDE 0.2 MG: 0.2 INJECTION, SOLUTION INTRAMUSCULAR; INTRAVENOUS; SUBCUTANEOUS at 16:26

## 2024-09-19 RX ADMIN — ONDANSETRON 4 MG: 2 INJECTION INTRAMUSCULAR; INTRAVENOUS at 15:48

## 2024-09-19 RX ADMIN — PHENOL 1 ML: 1.5 LIQUID ORAL at 22:29

## 2024-09-19 RX ADMIN — HYDROMORPHONE HYDROCHLORIDE 0.2 MG: 0.2 INJECTION, SOLUTION INTRAMUSCULAR; INTRAVENOUS; SUBCUTANEOUS at 16:17

## 2024-09-19 RX ADMIN — PROPOFOL 50 MCG/KG/MIN: 10 INJECTION, EMULSION INTRAVENOUS at 15:04

## 2024-09-19 RX ADMIN — ACETAMINOPHEN 975 MG: 325 TABLET ORAL at 21:11

## 2024-09-19 RX ADMIN — SODIUM CHLORIDE, POTASSIUM CHLORIDE, SODIUM LACTATE AND CALCIUM CHLORIDE: 600; 310; 30; 20 INJECTION, SOLUTION INTRAVENOUS at 14:43

## 2024-09-19 RX ADMIN — ALLOPURINOL 300 MG: 300 TABLET ORAL at 21:11

## 2024-09-19 RX ADMIN — DEXMEDETOMIDINE HYDROCHLORIDE 20 MCG: 100 INJECTION, SOLUTION INTRAVENOUS at 15:05

## 2024-09-19 RX ADMIN — KETAMINE HYDROCHLORIDE 25 MG: 10 INJECTION INTRAMUSCULAR; INTRAVENOUS at 15:43

## 2024-09-19 RX ADMIN — Medication 3 G: at 15:08

## 2024-09-19 RX ADMIN — LOSARTAN POTASSIUM 25 MG: 25 TABLET, FILM COATED ORAL at 21:11

## 2024-09-19 RX ADMIN — PROPOFOL 350 MG: 10 INJECTION, EMULSION INTRAVENOUS at 15:04

## 2024-09-19 RX ADMIN — FEXOFENADINE HCL 180 MG: 180 TABLET ORAL at 21:12

## 2024-09-19 RX ADMIN — CYCLOSPORINE 1 DROP: 0.5 EMULSION OPHTHALMIC at 21:13

## 2024-09-19 RX ADMIN — MINERAL OIL, PETROLATUM 3.5 G: 425; 573 OINTMENT OPHTHALMIC at 22:31

## 2024-09-19 RX ADMIN — CEFAZOLIN SODIUM 2 G: 2 INJECTION, SOLUTION INTRAVENOUS at 22:26

## 2024-09-19 RX ADMIN — WARFARIN SODIUM 10 MG: 5 TABLET ORAL at 21:12

## 2024-09-19 RX ADMIN — KETAMINE HYDROCHLORIDE 25 MG: 10 INJECTION INTRAMUSCULAR; INTRAVENOUS at 15:04

## 2024-09-19 RX ADMIN — SENNOSIDES AND DOCUSATE SODIUM 1 TABLET: 8.6; 5 TABLET ORAL at 21:11

## 2024-09-19 ASSESSMENT — ACTIVITIES OF DAILY LIVING (ADL)
ADLS_ACUITY_SCORE: 38
ADLS_ACUITY_SCORE: 35
ADLS_ACUITY_SCORE: 37
ADLS_ACUITY_SCORE: 38
ADLS_ACUITY_SCORE: 37
ADLS_ACUITY_SCORE: 38
ADLS_ACUITY_SCORE: 37
ADLS_ACUITY_SCORE: 38
ADLS_ACUITY_SCORE: 37
ADLS_ACUITY_SCORE: 37
ADLS_ACUITY_SCORE: 36

## 2024-09-19 ASSESSMENT — ENCOUNTER SYMPTOMS: DYSRHYTHMIAS: 1

## 2024-09-19 NOTE — INTERVAL H&P NOTE
"I have reviewed the surgical (or preoperative) H&P that is linked to this encounter, and examined the patient. There are no significant changes    Tanner CHATO Schwartz MD      Clinical Conditions Present on Arrival:  Clinically Significant Risk Factors Present on Admission                # Drug Induced Coagulation Defect: home medication list includes an anticoagulant medication       # Severe Obesity: Estimated body mass index is 65.65 kg/m  as calculated from the following:    Height as of 9/6/24: 1.727 m (5' 8\").    Weight as of 9/8/24: 195.9 kg (431 lb 12.8 oz).       "

## 2024-09-19 NOTE — ANESTHESIA PROCEDURE NOTES
Airway         Procedure Start/Stop Times: 9/19/2024 3:04 PM and 9/19/2024 3:08 PM  Staff -        Anesthesiologist:  Davonte Berrios MD       CRNA: Herber Apple APRN CRNA       Performed By: anesthesiologist  Consent for Airway        Urgency: elective  Indications and Patient Condition       Indications for airway management: rin-procedural       Induction type:intravenous       Mask difficulty assessment: 0 - not attempted    Final Airway Details       Final airway type: supraglottic airway    Supraglottic Airway Details        Type: LMA       LMA size: 4    Post intubation assessment        Placement verified by: capnometry, equal breath sounds and chest rise        Number of attempts at approach: 1       Number of other approaches attempted: 0       Secured with: tape       Ease of procedure: easy       Dentition: Intact    Medication(s) Administered   Medication Administration Time: 9/19/2024 3:04 PM

## 2024-09-19 NOTE — ANESTHESIA CARE TRANSFER NOTE
Patient: Bina Miller    Procedure: Procedure(s):  CYSTOSCOPY, LEFT URETEROSCOPY, RETROGRADE PYELOGRAM  WITH HOLMIUM LASER LITHOTRIPSY AND LEFT URETERAL STENT EXCHANGE       Diagnosis: Kidney stone [N20.0]  Diagnosis Additional Information: No value filed.    Anesthesia Type:   General     Note:    Oropharynx: oropharynx clear of all foreign objects  Level of Consciousness: awake  Oxygen Supplementation: face mask  Level of Supplemental Oxygen (L/min / FiO2): 5  Independent Airway: airway patency satisfactory and stable  Dentition: dentition unchanged  Vital Signs Stable: post-procedure vital signs reviewed and stable  Report to RN Given: handoff report given  Patient transferred to: PACU    Handoff Report: Identifed the Patient, Identified the Reponsible Provider, Reviewed the pertinent medical history, Discussed the surgical course, Reviewed Intra-OP anesthesia mangement and issues during anesthesia, Set expectations for post-procedure period and Allowed opportunity for questions and acknowledgement of understanding      Vitals:  Vitals Value Taken Time   /75 09/19/24 1603   Temp 36  C (96.8  F) 09/19/24 1603   Pulse 83 09/19/24 1603   Resp 18 09/19/24 1603   SpO2 97 % 09/19/24 1603   Vitals shown include unfiled device data.    Electronically Signed By: WILDA Gannon CRNA  September 19, 2024  4:05 PM

## 2024-09-19 NOTE — OP NOTE
Date of surgery: 9/19/2024  Location:[facility] [place]      Surgeon: Tanner Schwartz MD      Anesthesia: General    Preoperative diagnosis: History of urinary tract infection with 5 mm of ureteral left-sided stone status post ureteral stenting now presents for definitive stone management    Postoperative diagnosis: Same    Procedure: Cystoscopy with left-sided ureteral stent removal and replacement with ureteroscopy holmium laser lithotripsy stone extraction retrograde pyelogram    Operative indication: Bina Miller is a 58 year old female prior left ureteral stenting for urinary tract infection and obstructing ureteral stone now presents for definitive management    Operative findings: Stone visualized dense calcium oxalate monohydrate appearance.  Fragmented and removed    Operative procedure: The patient was brought to the operating room.  General anesthesia.  Lithotomy sterile conditions    Cystoscopy was performed urethra is normal at the level of the bladder there is a ureteral stent which is grasped and withdrawn and a Bentson guidewire was then passed into the ureter up to the renal pelvis    Flexible ureteroscopy was performed of the entire ureter and no stones visualized.  I then inspect the renal pelvis and collecting system and there is a 5 mm dense appearing calcium oxalate monohydrate stone in the lower pole calyx.  This is captured within the basket and attempts were made to withdraw the stone intact however the stone is too large to remove intact and requires lithotripsy.  We moved the stone into an upper pole calyx    We replaced a Bentson guidewire with a ureteral access sheath and are able to perform flexible ureteroscopy through that.  Holmium laser was then used to break the stone up into multiple fragments which are then able to be the easily extracted through serial stone basket extraction using a tipless basket to remove all of the stone    A retrograde pyelogram using several cc of  Omnipaque outlines the left collecting system at the end of the procedure through the ureteral access sheath.  There is no injury or extravasation    The access sheath is removed then over a Bentson guidewire a 6 x 26 [place] ureteral stent is passed coiled in the renal pelvis and within the bladder the renal pelvis is seen to drain well she is awoken from anesthesia after the bladder was drained and transferred recovery stable    Drains: 626 stent    Specimens: Left sided kidney stone    Estimated blood loss: Minimal    Complications: None    Tanner Schwartz MD

## 2024-09-19 NOTE — ANESTHESIA PREPROCEDURE EVALUATION
Anesthesia Pre-Procedure Evaluation    Patient: Bina Mliler   MRN: [id number] : 1965        Procedure : Procedure(s):  CYSTOSCOPY, LEFT URETEROSCOPY  WITH HOLMIUM LASER LITHOTRIPSY AND LEFT URETERAL STENT EXCHANGE          Past Medical History:   Diagnosis Date    Atrial fibrillation (H)     Hypertension     Kidney stone     Lupus erythematosus     Lymphedema     Migraine     Obese     Sleep apnea       Past Surgical History:   Procedure Laterality Date    COMBINED CYSTOSCOPY, RETROGRADES, EXCHANGE STENT URETER(S) Left 2024    Procedure: CYSTOSCOPY, WITH LEFT RETROGRADE PYELOGRAM AND LEFT URETERAL STENT REPLACEMENT;  Surgeon: Fernando Briceno MD;  Location: [facility] OR    LAPAROSCOPIC HYSTERECTOMY SUPRACERVICAL      TONSILLECTOMY      as child     WISDOM TOOTH EXTRACTION        Allergies   Allergen Reactions    Ace Inhibitors Cough    Ibuprofen Hives    Keflex [Cephalexin] Other (See Comments) and Headache     Light sensitivity    Methotrexate Nausea     diahrrea    Novocain [Procaine] Hives    Penicillins Other (See Comments)     Patient does not take as she has a strong family history anaphylaxis in the family.      Social History     Tobacco Use    Smoking status: Never    Smokeless tobacco: Never   Substance Use Topics    Alcohol use: No      Wt Readings from Last 1 Encounters:   24 (!) 195.9 kg (431 lb 12.8 oz)        Anesthesia Evaluation   Pt has had prior anesthetic.     History of anesthetic complications       ROS/MED HX  ENT/Pulmonary:     (+) sleep apnea,                                       Neurologic:     (+)      migraines,                          Cardiovascular:     (+)  hypertension- -   -  - -                        dysrhythmias, a-fib,             METS/Exercise Tolerance: 4 - Raking leaves, gardening    Hematologic:       Musculoskeletal:       GI/Hepatic:  - neg GI/hepatic ROS     Renal/Genitourinary:     (+)       Nephrolithiasis ,       Endo:     (+)             "   Obesity (super morbid),       Psychiatric/Substance Use:       Infectious Disease:       Malignancy:   (+) Malignancy, History of Lymphoma/Leukemia.    Other:            Physical Exam    Airway        Mallampati: III   TM distance: > 3 FB   Neck ROM: full   Mouth opening: > 3 cm    Respiratory Devices and Support         Dental       (+) Modest Abnormalities - crowns, retainers, 1 or 2 missing teeth      Cardiovascular          Rhythm and rate: irregular     Pulmonary           breath sounds clear to auscultation           OUTSIDE LABS:  CBC:   Lab Results   Component Value Date    WBC 8.4 09/07/2024    WBC 10.7 09/06/2024    HGB 12.5 09/07/2024    HGB 12.9 09/06/2024    HCT 39.4 09/07/2024    HCT 40.3 09/06/2024     09/07/2024     09/06/2024     BMP:   Lab Results   Component Value Date     09/07/2024     09/06/2024    POTASSIUM 4.6 09/09/2024    POTASSIUM 4.0 09/08/2024    CHLORIDE 102 09/07/2024    CHLORIDE 100 09/06/2024    CO2 25 09/07/2024    CO2 25 09/06/2024    BUN 14.2 09/07/2024    BUN 19.4 09/06/2024    CR 0.84 09/07/2024    CR 1.41 (H) 09/06/2024     (H) 09/07/2024     (H) 09/07/2024     COAGS:   Lab Results   Component Value Date    INR 1.77 (H) 09/09/2024     POC: No results found for: \"BGM\", \"HCG\", \"HCGS\"  HEPATIC:   Lab Results   Component Value Date    ALBUMIN 4.1 09/06/2024    PROTTOTAL 7.2 09/06/2024    ALT 17 09/06/2024    AST 17 09/06/2024    ALKPHOS 69 09/06/2024    BILITOTAL 0.9 09/06/2024     OTHER:   Lab Results   Component Value Date    LACT 1.5 09/05/2024    RAMÓN 9.3 09/07/2024    MAG 2.0 09/09/2024    LIPASE 24 09/05/2024       Anesthesia Plan    ASA Status:  3    NPO Status:  NPO Appropriate    Anesthesia Type: General.     - Airway: LMA   Induction: Intravenous, Propofol.   Maintenance: Balanced.   Techniques and Equipment:     Airway: glidescope if ETT required.       Consents    Anesthesia Plan(s) and associated risks, benefits, and realistic " "alternatives discussed. Questions answered and patient/representative(s) expressed understanding.     - Discussed:     - Discussed with:  Patient            Postoperative Care    Pain management: Multi-modal analgesia.   PONV prophylaxis: Ondansetron (or other 5HT-3), Dexamethasone or Solumedrol     Comments:               Tashia Guerrero MD              # Drug Induced Coagulation Defect: home medication list includes an anticoagulant medication   # Severe Obesity: Estimated body mass index is 65.65 kg/m  as calculated from the following:    Height as of 9/6/24: 1.727 m (5' 8\").    Weight as of 9/8/24: 195.9 kg (431 lb 12.8 oz).      "

## 2024-09-19 NOTE — ANESTHESIA POSTPROCEDURE EVALUATION
Patient: Bina Miller    Procedure: Procedure(s):  CYSTOSCOPY, LEFT URETEROSCOPY, RETROGRADE PYELOGRAM  WITH HOLMIUM LASER LITHOTRIPSY AND LEFT URETERAL STENT EXCHANGE       Anesthesia Type:  General    Note:  Disposition: Inpatient   Postop Pain Control: Uneventful            Sign Out: Well controlled pain   PONV: No   Neuro/Psych: Uneventful            Sign Out: Acceptable/Baseline neuro status   Airway/Respiratory: Uneventful            Sign Out: Acceptable/Baseline resp. status   CV/Hemodynamics: Uneventful            Sign Out: Acceptable CV status; No obvious hypovolemia; No obvious fluid overload   Other NRE: NONE   DID A NON-ROUTINE EVENT OCCUR?            Last vitals:  Vitals Value Taken Time   /72 09/19/24 1716   Temp 34.1  C (93.38  F) 09/19/24 1732   Pulse 96 09/19/24 1804   Resp 33 09/19/24 1804   SpO2 94 % 09/19/24 1804   Vitals shown include unfiled device data.    Electronically Signed By: Andra Lucero MD  September 19, 2024  6:06 PM

## 2024-09-20 VITALS
RESPIRATION RATE: 18 BRPM | SYSTOLIC BLOOD PRESSURE: 135 MMHG | WEIGHT: 293 LBS | OXYGEN SATURATION: 92 % | TEMPERATURE: 98.5 F | DIASTOLIC BLOOD PRESSURE: 64 MMHG | BODY MASS INDEX: 66.92 KG/M2 | HEART RATE: 89 BPM

## 2024-09-20 LAB
ANION GAP SERPL CALCULATED.3IONS-SCNC: 10 MMOL/L (ref 7–15)
BUN SERPL-MCNC: 11.5 MG/DL (ref 8–23)
CALCIUM SERPL-MCNC: 9.2 MG/DL (ref 8.8–10.4)
CHLORIDE SERPL-SCNC: 105 MMOL/L (ref 98–107)
CREAT SERPL-MCNC: 0.73 MG/DL (ref 0.51–0.95)
EGFRCR SERPLBLD CKD-EPI 2021: >90 ML/MIN/1.73M2
ERYTHROCYTE [DISTWIDTH] IN BLOOD BY AUTOMATED COUNT: 17.2 % (ref 10–15)
GLUCOSE SERPL-MCNC: 140 MG/DL (ref 70–99)
HCO3 SERPL-SCNC: 24 MMOL/L (ref 22–29)
HCT VFR BLD AUTO: 39.5 % (ref 35–47)
HGB BLD-MCNC: 12.4 G/DL (ref 11.7–15.7)
INR PPP: 0.94 (ref 0.85–1.15)
MAGNESIUM SERPL-MCNC: 2.1 MG/DL (ref 1.7–2.3)
MCH RBC QN AUTO: 28.8 PG (ref 26.5–33)
MCHC RBC AUTO-ENTMCNC: 31.4 G/DL (ref 31.5–36.5)
MCV RBC AUTO: 92 FL (ref 78–100)
PHOSPHATE SERPL-MCNC: 4.3 MG/DL (ref 2.5–4.5)
PLATELET # BLD AUTO: 220 10E3/UL (ref 150–450)
POTASSIUM SERPL-SCNC: 4.4 MMOL/L (ref 3.4–5.3)
RBC # BLD AUTO: 4.31 10E6/UL (ref 3.8–5.2)
SODIUM SERPL-SCNC: 139 MMOL/L (ref 135–145)
WBC # BLD AUTO: 5.7 10E3/UL (ref 4–11)

## 2024-09-20 PROCEDURE — 258N000003 HC RX IP 258 OP 636: Performed by: UROLOGY

## 2024-09-20 PROCEDURE — 83735 ASSAY OF MAGNESIUM: CPT | Performed by: STUDENT IN AN ORGANIZED HEALTH CARE EDUCATION/TRAINING PROGRAM

## 2024-09-20 PROCEDURE — 36415 COLL VENOUS BLD VENIPUNCTURE: CPT | Performed by: STUDENT IN AN ORGANIZED HEALTH CARE EDUCATION/TRAINING PROGRAM

## 2024-09-20 PROCEDURE — 85027 COMPLETE CBC AUTOMATED: CPT | Performed by: STUDENT IN AN ORGANIZED HEALTH CARE EDUCATION/TRAINING PROGRAM

## 2024-09-20 PROCEDURE — 80048 BASIC METABOLIC PNL TOTAL CA: CPT | Performed by: STUDENT IN AN ORGANIZED HEALTH CARE EDUCATION/TRAINING PROGRAM

## 2024-09-20 PROCEDURE — 250N000011 HC RX IP 250 OP 636: Mod: JZ | Performed by: UROLOGY

## 2024-09-20 PROCEDURE — 250N000013 HC RX MED GY IP 250 OP 250 PS 637: Performed by: STUDENT IN AN ORGANIZED HEALTH CARE EDUCATION/TRAINING PROGRAM

## 2024-09-20 PROCEDURE — 99215 OFFICE O/P EST HI 40 MIN: CPT | Performed by: INTERNAL MEDICINE

## 2024-09-20 PROCEDURE — 84100 ASSAY OF PHOSPHORUS: CPT | Performed by: STUDENT IN AN ORGANIZED HEALTH CARE EDUCATION/TRAINING PROGRAM

## 2024-09-20 PROCEDURE — 85610 PROTHROMBIN TIME: CPT | Performed by: STUDENT IN AN ORGANIZED HEALTH CARE EDUCATION/TRAINING PROGRAM

## 2024-09-20 PROCEDURE — 250N000013 HC RX MED GY IP 250 OP 250 PS 637: Performed by: UROLOGY

## 2024-09-20 RX ORDER — CEFDINIR 300 MG/1
300 CAPSULE ORAL 2 TIMES DAILY
Qty: 10 CAPSULE | Refills: 0 | Status: SHIPPED | OUTPATIENT
Start: 2024-09-20 | End: 2024-09-25

## 2024-09-20 RX ORDER — CEPHALEXIN 500 MG/1
500 CAPSULE ORAL 3 TIMES DAILY
Qty: 15 CAPSULE | Refills: 0 | Status: SHIPPED | OUTPATIENT
Start: 2024-09-20 | End: 2024-09-20

## 2024-09-20 RX ADMIN — DULOXETINE HYDROCHLORIDE 40 MG: 20 CAPSULE, DELAYED RELEASE ORAL at 09:23

## 2024-09-20 RX ADMIN — OXYCODONE HYDROCHLORIDE 5 MG: 5 TABLET ORAL at 01:18

## 2024-09-20 RX ADMIN — CYCLOSPORINE 1 DROP: 0.5 EMULSION OPHTHALMIC at 09:27

## 2024-09-20 RX ADMIN — SODIUM CHLORIDE: 9 INJECTION, SOLUTION INTRAVENOUS at 06:30

## 2024-09-20 RX ADMIN — ALLOPURINOL 300 MG: 300 TABLET ORAL at 09:23

## 2024-09-20 RX ADMIN — CEFAZOLIN SODIUM 2 G: 2 INJECTION, SOLUTION INTRAVENOUS at 06:34

## 2024-09-20 RX ADMIN — FEXOFENADINE HCL 180 MG: 180 TABLET ORAL at 09:23

## 2024-09-20 RX ADMIN — POLYETHYLENE GLYCOL 3350 17 G: 17 POWDER, FOR SOLUTION ORAL at 09:22

## 2024-09-20 RX ADMIN — VERAPAMIL HYDROCHLORIDE 180 MG: 180 TABLET, FILM COATED, EXTENDED RELEASE ORAL at 09:23

## 2024-09-20 RX ADMIN — SENNOSIDES AND DOCUSATE SODIUM 1 TABLET: 8.6; 5 TABLET ORAL at 09:23

## 2024-09-20 RX ADMIN — ACETAMINOPHEN 975 MG: 325 TABLET ORAL at 06:38

## 2024-09-20 RX ADMIN — HYDROXYCHLOROQUINE SULFATE 200 MG: 200 TABLET, FILM COATED ORAL at 09:23

## 2024-09-20 ASSESSMENT — ACTIVITIES OF DAILY LIVING (ADL)
ADLS_ACUITY_SCORE: 37
ADLS_ACUITY_SCORE: 39
ADLS_ACUITY_SCORE: 39
ADLS_ACUITY_SCORE: 37
ADLS_ACUITY_SCORE: 37
ADLS_ACUITY_SCORE: 39
ADLS_ACUITY_SCORE: 39
ADLS_ACUITY_SCORE: 37
ADLS_ACUITY_SCORE: 39
ADLS_ACUITY_SCORE: 37
ADLS_ACUITY_SCORE: 39
ADLS_ACUITY_SCORE: 39
ADLS_ACUITY_SCORE: 37
ADLS_ACUITY_SCORE: 39
ADLS_ACUITY_SCORE: 39

## 2024-09-20 NOTE — PLAN OF CARE
Goal Outcome Evaluation:      Plan of Care Reviewed With: patient    Overall Patient Progress: improvingOverall Patient Progress: improving    Outcome Evaluation: POD1 stone removal with stent placed. voiding well- does endorse some burning with urination flank pain. tylenol effective for discomfort. Will dc home this afternoon with oral abx.      Problem: Adult Inpatient Plan of Care  Goal: Optimal Comfort and Wellbeing  Outcome: Adequate for Care Transition  Intervention: Monitor Pain and Promote Comfort  Recent Flowsheet Documentation  Taken 9/20/2024 3950 by Jennifer Richey RN  Pain Management Interventions: declines     Problem: Adult Inpatient Plan of Care  Goal: Readiness for Transition of Care  Outcome: Adequate for Care Transition

## 2024-09-20 NOTE — PHARMACY-ANTICOAGULATION SERVICE
Clinical Pharmacy - Warfarin Dosing Consult     Pharmacy has been consulted to manage this patient s warfarin therapy.  Indication: Atrial Fibrillation  Therapy Goal: INR 2-3  Warfarin Prior to Admission: Yes  Warfarin PTA Regimen: 10 mg on Thursday, 7.5 mg all other days of the week  Significant drug interactions: Verapamil, Ancef  Recent documented change in oral intake/nutrition: Unknown  Dose Comments: Per urology - give usual home dose of 10 mg and re evaluate tomorrow.    INR   Date Value Ref Range Status   09/19/2024 0.94 0.85 - 1.15 Final   09/09/2024 1.77 (H) 0.85 - 1.15 Final       Recommend warfarin 10 mg today.  Pharmacy will monitor Bina Miller daily and order warfarin doses to achieve specified goal.      Please contact pharmacy as soon as possible if the warfarin needs to be held for a procedure or if the warfarin goals change.

## 2024-09-20 NOTE — DISCHARGE SUMMARY
[facility] DISCHARGE SUMMARY    Name: Bina Miller  : 1965  MRN: [id number]  PCP: Patricia [facility]    Place of service: [facility]    Admission date: 2024   Discharge date: 2024     Surgeon: Dr. Tanner Schwartz     Surgical Procedure: Cystoscopy with left-sided ureteral stent removal and replacement with ureteroscopy holmium laser lithotripsy stone extraction retrograde pyelogram     Date of surgery: 2024    Principal Diagnosis at Discharge:   Kidney stone [N20.0]     Other diagnosis addressed during hospitalization:  none    Consults:  medicine    Diagnostic Studies :  none    Complications while in the Hospital:  none    Physical Exam:  Temp: 98.6  F (37  C) Temp src: Oral BP: (!) 146/65 Pulse: 84   Resp: 18 SpO2: 94 % O2 Device: None (Room air) Oxygen Delivery: 2 LPM    Gen: nad, alert  Neuro: A&O x 3. Moving all extremities equally.   Resp: Reg resp rate/depth.   Abdomen: appropriately tender, non distended, no rebound or peritoneal signs  : voiding on her own    Brief history of hospital course:  This is a 58 year old female admitted for Kidney stone [N20.0]. Patient underwent above procedure. Patient tolerated the procedure well. Post operative course was unremarkable. By the day of discharge, the patient was ambulatory, tolerating diet, pain was controlled with oral analgesics, labs and vitals stable.     Labs:  Hemoglobin   Date Value Ref Range Status   2024 12.4 11.7 - 15.7 g/dL Final   ]  Platelet Count   Date Value Ref Range Status   2024 220 150 - 450 10e3/uL Final     WBC Count   Date Value Ref Range Status   2024 5.7 4.0 - 11.0 10e3/uL Final     Creatinine   Date Value Ref Range Status   2024 0.73 0.51 - 0.95 mg/dL Final     Potassium   Date Value Ref Range Status   2024 4.4 3.4 - 5.3 mmol/L Final     INR   Date Value Ref Range Status   2024 0.94 0.85 - 1.15 Final        Pending Labs:  Surgical Pathology  pending    DISPOSITION: Home    DISCHARGE CONDITION: Good/Stable    DISCHARGE MEDICATIONS:      Review of your medicines        START taking        Dose / Directions   cephALEXin 500 MG capsule  Commonly known as: KEFLEX      Dose: 500 mg  Take 1 capsule (500 mg) by mouth 3 times daily for 5 days.  Quantity: 15 capsule  Refills: 0            CONTINUE these medicines which have NOT CHANGED        Dose / Directions   albuterol 108 (90 Base) MCG/ACT inhaler  Commonly known as: PROAIR HFA/PROVENTIL HFA/VENTOLIN HFA      Dose: 2 puff  Inhale 2 puffs into the lungs every 6 hours as needed for shortness of breath, wheezing or cough.  Refills: 0     allopurinol 300 MG tablet  Commonly known as: ZYLOPRIM  Used for: Swelling of limb, Venous hypertension of lower extremity, bilateral, Rheumatoid arthritis (H), Lupus (H), Perforating folliculitis, Morbid obesity with BMI of 60.0-69.9, adult (H), Phlebitis      Dose: 300 mg  [ALLOPURINOL (ZYLOPRIM) 300 MG TABLET] Take 300 mg by mouth daily.  Refills: 0     DULoxetine 20 MG capsule  Commonly known as: CYMBALTA      Dose: 2 capsule  Take 2 capsules by mouth daily.  Refills: 0     fexofenadine 180 MG tablet  Commonly known as: ALLEGRA      Dose: 180 mg  Take 180 mg by mouth daily.  Refills: 0     fish oil-omega-3 fatty acids 1000 MG capsule      Dose: 3 g  Take 3 g by mouth daily.  Refills: 0     FLUTICASONE PROPIONATE (NASAL) 50 MCG/ACT Susp  Used for: Swelling of limb, Venous hypertension of lower extremity, bilateral, Rheumatoid arthritis (H), Lupus (H), Perforating folliculitis, Morbid obesity with BMI of 60.0-69.9, adult (H), Phlebitis      Dose: 1 spray  Spray 1 spray into both nostrils daily.  Refills: 0     hydroxychloroquine 200 MG tablet  Commonly known as: PLAQUENIL  Used for: Swelling of limb, Venous hypertension of lower extremity, bilateral, Rheumatoid arthritis (H), Lupus (H), Perforating folliculitis, Morbid obesity with BMI of 60.0-69.9, adult (H), Phlebitis       Dose: 200 mg  [HYDROXYCHLOROQUINE (PLAQUENIL) 200 MG TABLET] Take 200 mg by mouth 2 (two) times a day.  Refills: 0     * ketoconazole 2 % external cream  Commonly known as: NIZORAL      Apply topically 2 times daily as needed.  Refills: 0     * ketoconazole 1 % shampoo      Externally apply topically daily as needed.  Refills: 0     lactobacillus rhamnosus (GG) capsule      Dose: 1 capsule  Take 1 capsule by mouth daily.  Refills: 0     losartan 25 MG tablet  Commonly known as: COZAAR  Used for: Swelling of limb, Venous hypertension of lower extremity, bilateral, Rheumatoid arthritis (H), Lupus (H), Perforating folliculitis, Morbid obesity with BMI of 60.0-69.9, adult (H), Phlebitis      Dose: 25 mg  Take 25 mg by mouth every evening.  Refills: 0     Lotemax 0.5 % Gel  Used for: Swelling of limb, Venous hypertension of lower extremity, bilateral, Rheumatoid arthritis (H), Lupus (H), Perforating folliculitis, Morbid obesity with BMI of 60.0-69.9, adult (H), Phlebitis  Generic drug: Loteprednol Etabonate      Dose: 1 drop  Place 1 drop into both eyes daily as needed.  Refills: 0     Multi-vitamin per tablet  Generic drug: multivitamin w/minerals      Dose: 1 tablet  Take 1 tablet by mouth daily.  Refills: 0     Naltrexone HCl (Pain) 4.5 MG Caps      Dose: 1 capsule  Take 1 capsule by mouth daily.  Refills: 0     prednisoLONE acetate 1 % ophthalmic suspension  Commonly known as: PRED FORTE      Dose: 1 drop  Place 1 drop into both eyes 4 times daily as needed.  Refills: 0     Refresh P.M. Oint      Dose: 1 each  Place 1 each into both eyes at bedtime. Apply thin ribbon in each eye at bedtime  Refills: 0     Restasis 0.05 % ophthalmic emulsion  Used for: Swelling of limb, Venous hypertension of lower extremity, bilateral, Rheumatoid arthritis (H), Lupus (H), Perforating folliculitis, Morbid obesity with BMI of 60.0-69.9, adult (H), Phlebitis  Generic drug: cycloSPORINE      Dose: 1 drop  Place 1 drop into both eyes 2  times daily.  Refills: 0     tamsulosin 0.4 MG capsule  Commonly known as: FLOMAX  Used for: Obstructive uropathy      Dose: 0.4 mg  Take 1 capsule (0.4 mg) by mouth daily.  Quantity: 30 capsule  Refills: 0     Turmeric 450 MG Caps      Dose: 1 capsule  Take 1 capsule by mouth daily.  Refills: 0     Verapamil HCl 180 MG Tbcr  Used for: Swelling of limb, Venous hypertension of lower extremity, bilateral, Rheumatoid arthritis (H), Lupus (H), Perforating folliculitis, Morbid obesity with BMI of 60.0-69.9, adult (H), Phlebitis      Dose: 180 mg  Take 180 mg by mouth daily.  Refills: 0     vitamin C 500 MG tablet  Commonly known as: ASCORBIC ACID      Dose: 500 mg  Take 500 mg by mouth daily.  Refills: 0     vitamin D3 50 mcg (2000 units) tablet  Commonly known as: CHOLECALCIFEROL      Dose: 1 tablet  Take 1 tablet by mouth 2 times daily.  Refills: 0     warfarin ANTICOAGULANT 5 MG tablet  Commonly known as: COUMADIN      Take by mouth See Admin Instructions. Take 7.5 mg all days of the week except Thursday take 10 mg  Refills: 0           * This list has 2 medication(s) that are the same as other medications prescribed for you. Read the directions carefully, and ask your doctor or other care provider to review them with you.                   Where to get your medicines        These medications were sent to [facility] [address] [address]      Phone: [phone]   cephALEXin 500 MG capsule         DISCHARGE PLAN:   - Follow up with Dr. Tanner Schwartz as scheduled prior to your procedure   - [facility] with [facility] as [facility]   - Take medication as prescribed, avoid anticoagulants per surgeon and PCP recommendations.   - Wound / drain care: As directed prior to discharge  - Physical activity: As tolerated, no heavy lifting. No driving while taking narcotics.   - Diet: regular  - Medication: please review  discharge Med req/AVS  - Warning signs discussed with patient about when to call the clinic/hospital  - All questions and concerns were answered for the patient prior to discharge  - Patient verbalized understanding.     Discussed patient with Dr. Tanner Schwartz on day of discharge.     Viridiana Noble PA-C  [facility]   [phone]     I saw the patient on the date of discharge  Total time spent for discharge on date of discharge: 20 minutes    Physician(s) in addition to primary physician who should receive a copy:  CC1: [facility]           ?

## 2024-09-20 NOTE — PLAN OF CARE
Problem: Adult Inpatient Plan of Care  Goal: Optimal Comfort and Wellbeing  Intervention: Monitor Pain and Promote Comfort  Recent Flowsheet Documentation  Taken 9/20/2024 0118 by Charmaine Styles RN  Pain Management Interventions: medication (see MAR)  Taken 9/20/2024 0024 by Charmaine Styles, RN  Pain Management Interventions:   declines   emotional support   Goal Outcome Evaluation:       Pt is  alert and oriented x4, prn oxy given x1 for pain rated 7/10. On continue pulse oxi, desatting below 90s on RA when asleep, put on 2lpm NC overnight. IVF running per order, assist of  one with walker to the bathroom. Continue to monior.  Vital signs:  Temp: 98.1  F (36.7  C) Temp src: Oral BP: 139/76 Pulse: 85   Resp: 18 SpO2: 95 % O2 Device: None (Room air) Oxygen Delivery: 2 LPM

## 2024-09-20 NOTE — CONSULTS
[facility]  Consult Note - Hospitalist Service  Date of Admission:  9/19/2024  Consult Requested by: Urology  Reason for Consult: Medical comanagement    Assessment & Plan   Assessment:  Bina Miller is a 58 year old female with a past medical history documented below presented to the hospital for cystoscopy, left ureteroscopy with holmium laser lithotripsy and left ureteral stent exchange and is s/p procedure, postprocedure medicine was consulted for medical comanagement.    Problem list and plan:  Nephrolithiasis  As per patient recently presented to the hospital with abdominal pain and was found to have ureteral stone and UTI, as patient was on Coumadin could not have the procedure done at that time so received pain control medication, antibiotics and had stent placement post which she was discharged and presented again today after INR was subtherapeutic to have lithotripsy and stent exchange  Patient is currently s/p procedure(cystoscopy, left ureteroscopy, retrograde pyelogram with holmium laser lithotripsy and left ureteral stent exchange) and doing fine postprocedure  Continue with pain management as per protocol  Urology following, continue to follow-up recommendations    History of gout  Continue with allopurinol    History of lupus erythematosus  Continue with Plaquenil    History of lymphedema  Patient has compression stockings on    History of hypertension  Continue with losartan  Monitor vital signs as per protocol    History of A-fib with secondary hypercoagulable state  Continue with Coumadin, discussed with urology who are in agreement to starting the patient on Coumadin as per home regimen  Pharmacy to dose Coumadin  Continue with verapamil    Miscellaneous meds  Continue with duloxetine, Restasis, refresh plus eyedrops, prednisolone acetate ophthalmic solution, albuterol as needed  Continue with ketoconazole cream    DVT PPx  Intermittent pneumatic compression    "  Clinically Significant Risk Factors Present on Admission               # Drug Induced Coagulation Defect: home medication list includes an anticoagulant medication    # Hypertension: Noted on problem list         # Severe Obesity: Estimated body mass index is 66.92 kg/m  as calculated from the following:    Height as of 9/6/24: 1.727 m (5' 8\").    Weight as of this encounter: 199.6 kg (440 lb 1.6 oz).              Saad J. Gondal, MD  Hospitalist Service  Securely message with [employer] (more info)  Text page via [facility] Paging/Directory   ______________________________________________________________________    Chief Complaint   Nephrolithiasis    History is obtained from the patient    History of Present Illness   Bina Miller is a 58 year old female with a past medical history documented below presented to the hospital for cystoscopy, left ureteroscopy with holmium laser lithotripsy and left ureteral stent exchange and is s/p procedure, postprocedure medicine was consulted for medical comanagement.      Past Medical History    Past Medical History:   Diagnosis Date    Atrial fibrillation (H)     Hypertension     Kidney stone     Lupus erythematosus     Lymphedema     Migraine     Obese     Sleep apnea        Past Surgical History   Past Surgical History:   Procedure Laterality Date    COMBINED CYSTOSCOPY, RETROGRADES, EXCHANGE STENT URETER(S) Left 9/6/2024    Procedure: CYSTOSCOPY, WITH LEFT RETROGRADE PYELOGRAM AND LEFT URETERAL STENT REPLACEMENT;  Surgeon: Fernando Briceno MD;  Location: [facility] OR    LAPAROSCOPIC HYSTERECTOMY SUPRACERVICAL      TONSILLECTOMY      as child     WISDOM TOOTH EXTRACTION         Medications   I have reviewed this patient's current medications       Review of Systems    The 10 point Review of Systems is negative     Physical Exam   Vital Signs: Temp: 98.4  F (36.9  C) Temp src: Oral BP: 139/75 Pulse: 89   Resp: 18 SpO2: 93 % O2 Device: None (Room air) Oxygen Delivery: 2 " LPM  Weight: 440 lbs 1.6 oz    GENERAL: Patient was seen and examined at bedside with no acute distress, morbidly obese  HENT:  Head is normocephalic, atraumatic.   EYES:  Eyes have anicteric sclerae without conjunctival injection   LUNGS: Bilateral air entry, clear to auscultation bilaterally  CARDIOVASCULAR:  Regular rate and rhythm with no murmurs, gallops or rubs.  ABDOMEN:  Normal bowel sounds. Soft; nontender   EXT: Bilateral lower extremity lymphedema  SKIN:  No acute rashes.   NEUROLOGIC:  Grossly nonfocal.      Medical Decision Making       80 MINUTES SPENT BY ME on the date of service doing chart review, history, exam, documentation & further activities per the note.      Data     I have personally reviewed the following data over the past 24 hrs:    INR:  0.94 PTT:  N/A   D-dimer:  N/A Fibrinogen:  N/A       Imaging results reviewed over the past 24 hrs:   Recent Results (from the past 24 hour(s))   XR Surgery PIERRE L/T 5 Min Fluoro    Narrative    This exam was marked as non-reportable because it will not be read by a   radiologist or a [place] non-radiologist provider.

## 2024-09-20 NOTE — PHARMACY-ADMISSION MEDICATION HISTORY
Pharmacist Admission Medication History    Admission medication history is complete. The information provided in this note is only as accurate as the sources available at the time of the update.    Information Source(s): Patient via in-person    Pertinent Information: Patient's only home medication taken today was Verapamil     Changes made to PTA medication list:  Added: Refresh PM eye ointment  Deleted: Oxycodone, Carboxymethylcellullose  Changed: Warfarin from 5 mg daily to 7.5 mg all days except Thursday, Take 10 mg.     Allergies reviewed with patient and updates made in EHR: yes    Medication History Completed By: TOMAS LIRA RPH 9/19/2024 7:31 PM    PTA Med List   Medication Sig Note Last Dose    albuterol (PROAIR HFA/PROVENTIL HFA/VENTOLIN HFA) 108 (90 Base) MCG/ACT inhaler Inhale 2 puffs into the lungs every 6 hours as needed for shortness of breath, wheezing or cough.  More than a month at prn    allopurinol (ZYLOPRIM) 300 MG tablet [ALLOPURINOL (ZYLOPRIM) 300 MG TABLET] Take 300 mg by mouth daily.  9/18/2024 at am    DULoxetine (CYMBALTA) 20 MG capsule Take 2 capsules by mouth daily.  9/18/2024 at am    fexofenadine (ALLEGRA) 180 MG tablet Take 180 mg by mouth daily.  9/18/2024 at am    fish oil-omega-3 fatty acids 1000 MG capsule Take 3 g by mouth daily.  Past Month at am    fluticasone (FLONASE) 50 mcg/actuation nasal spray Spray 1 spray into both nostrils daily.  9/18/2024 at am    hydroxychloroquine (PLAQUENIL) 200 mg tablet [HYDROXYCHLOROQUINE (PLAQUENIL) 200 MG TABLET] Take 200 mg by mouth 2 (two) times a day.  9/18/2024 at pm    ketoconazole (NIZORAL) 2 % external cream Apply topically 2 times daily as needed.  Past Month at prn    ketoconazole 1 % shampoo Externally apply topically daily as needed.  Past Month at prn    lactobacillus rhamnosus, GG, (CULTURELL) capsule Take 1 capsule by mouth daily.  Past Month at am    losartan (COZAAR) 25 MG tablet Take 25 mg by mouth every evening.   9/18/2024 at hs    LOTEMAX 0.5 % DrpG Place 1 drop into both eyes daily as needed.  More than a month at prn    multivitamin w/minerals (MULTI-VITAMIN) tablet Take 1 tablet by mouth daily.  Past Month at am    Naltrexone HCl, Pain, 4.5 MG CAPS Take 1 capsule by mouth daily.  9/18/2024 at am - patient unable to supply and ok with missing a few doses    prednisoLONE acetate (PRED FORTE) 1 % ophthalmic suspension Place 1 drop into both eyes 4 times daily as needed.  Past Month at prn    RESTASIS 0.05 % ophthalmic emulsion Place 1 drop into both eyes 2 times daily.  9/19/2024 at am    tamsulosin (FLOMAX) 0.4 MG capsule Take 1 capsule (0.4 mg) by mouth daily.  9/18/2024 at am    Turmeric 450 MG CAPS Take 1 capsule by mouth daily.  Past Month at am    verapamil (CALAN-SR) 180 MG CR tablet Take 180 mg by mouth daily.  9/19/2024 at 0600    vitamin C (ASCORBIC ACID) 500 MG tablet Take 500 mg by mouth daily.  Past Month at am    vitamin D3 (CHOLECALCIFEROL) 50 mcg (2000 units) tablet Take 1 tablet by mouth 2 times daily.  Past Month at am    warfarin ANTICOAGULANT (COUMADIN) 5 MG tablet Take by mouth See Admin Instructions. Take 7.5 mg all days of the week except Thursday take 10 mg 9/17/2024: hold Warfarin until procedures are complete 9/9/2024    White Petrolatum-Mineral Oil (REFRESH P.M.) OINT Place 1 each into both eyes at bedtime. Apply thin ribbon in each eye at bedtime  9/18/2024 at hs

## 2024-09-20 NOTE — PLAN OF CARE
Goal Outcome Evaluation:  Patient returned from surgery this evening. VS have remained stable. Sating 93-94% on RA. Continuous pulse ox.  Reports burning pain with urination 4 out of 10. Has had scheduled tylenol only and that is all she has wanted.  Regular diet, gluten free. Tolerated gluten free cereal and banana well.  IVF hydration and cefazolin.  Voiding frequently, good amounts, pink tinged. Purewick placed for over night.  Hx afib-restarted on coumadin tonight. Goal INR 2.0-3.0. INR this evening was 0.94  No incisions  Up to the BR with SBA and walker.  Generalized skin lesions-perforating folliculitis.

## 2024-09-23 LAB
APPEARANCE STONE: NORMAL
COMPN STONE: NORMAL
SPECIMEN WT: 51 MG

## 2024-09-29 ENCOUNTER — HEALTH MAINTENANCE LETTER (OUTPATIENT)
Age: 59
End: 2024-09-29

## (undated) DEVICE — GLOVE BIOGEL PI INDICATOR 8.0 LF 41680

## (undated) DEVICE — SUCTION MANIFOLD NEPTUNE 2 SYS 1 PORT 702-025-000

## (undated) DEVICE — GUIDEWIRE BENTSON FLEX TIP 0.035"X150CM M0066201250

## (undated) DEVICE — LASER FIBER HOLMIUM MOSES 200 D/F/L AC-10030100

## (undated) DEVICE — SHEATH URETERAL ACCESS NAVIGATOR HD 11/13FRX36CM M0062502220

## (undated) DEVICE — GLOVE BIOGEL PI ULTRATOUCH G SZ 8.0 42180

## (undated) DEVICE — GLOVE SURG PI ULTRA TOUCH M SZ 7 LF 42670

## (undated) DEVICE — TUBING SUCTION MEDI-VAC 1/4"X20' N620A

## (undated) DEVICE — KIT ENDO FIRST STEP DISINFECTANT 200ML W/POUCH EP-4

## (undated) DEVICE — MAT FLOOR SURGICAL 40X38 0702140238

## (undated) DEVICE — CUSTOM PACK CYSTO PREFERRED SOT5BCYHEA

## (undated) DEVICE — TUBING SET THERMEDX UROLOGY SGL USE LL0006

## (undated) DEVICE — GLOVE BIOGEL PI SZ 6.5 40865

## (undated) DEVICE — GOWN IMPERVIOUS BREATHABLE SMART XLG 89045

## (undated) DEVICE — PREP DYNA-HEX 4% CHG SCRUB 4OZ BOTTLE MDS098710

## (undated) DEVICE — BASKET STONE RETRIEVAL NTNL ZERO TIP 1.9FRX90CM M0063901050

## (undated) DEVICE — SPONGE RAY-TEC 4X8" 7318

## (undated) DEVICE — SOL WATER IRRIG 3000ML BAG 2B7117

## (undated) DEVICE — TUBING IRRIG TUR Y TYPE 96" LF 6543-01

## (undated) DEVICE — CATH URETERAL OPEN END 5FRX70CM M0064002010

## (undated) DEVICE — SOL WATER IRRIG 1000ML BOTTLE 2F7114

## (undated) RX ORDER — FENTANYL CITRATE 50 UG/ML
INJECTION, SOLUTION INTRAMUSCULAR; INTRAVENOUS
Status: DISPENSED
Start: 2024-09-06

## (undated) RX ORDER — CEFAZOLIN SODIUM 1 G/3ML
INJECTION, POWDER, FOR SOLUTION INTRAMUSCULAR; INTRAVENOUS
Status: DISPENSED
Start: 2024-09-19